# Patient Record
Sex: FEMALE | Race: WHITE | NOT HISPANIC OR LATINO | Employment: FULL TIME | URBAN - METROPOLITAN AREA
[De-identification: names, ages, dates, MRNs, and addresses within clinical notes are randomized per-mention and may not be internally consistent; named-entity substitution may affect disease eponyms.]

---

## 2017-02-08 ENCOUNTER — OFFICE VISIT (OUTPATIENT)
Dept: FAMILY MEDICINE CLINIC | Facility: CLINIC | Age: 32
End: 2017-02-08

## 2017-02-08 VITALS
OXYGEN SATURATION: 100 % | BODY MASS INDEX: 44.24 KG/M2 | WEIGHT: 265.5 LBS | DIASTOLIC BLOOD PRESSURE: 80 MMHG | HEIGHT: 65 IN | SYSTOLIC BLOOD PRESSURE: 130 MMHG | HEART RATE: 98 BPM

## 2017-02-08 DIAGNOSIS — R50.9 FEVER AND CHILLS: ICD-10-CM

## 2017-02-08 DIAGNOSIS — R60.0 EDEMA EXTREMITIES: ICD-10-CM

## 2017-02-08 DIAGNOSIS — M79.10 MYALGIA: Primary | ICD-10-CM

## 2017-02-08 LAB
ALBUMIN SERPL-MCNC: 4.2 G/DL (ref 3.5–5.2)
ALBUMIN/GLOB SERPL: 1.4 G/DL
ALP SERPL-CCNC: 75 U/L (ref 39–117)
ALT SERPL-CCNC: 55 U/L (ref 1–33)
AST SERPL-CCNC: 61 U/L (ref 1–32)
BILIRUB SERPL-MCNC: 0.5 MG/DL (ref 0.1–1.2)
BUN SERPL-MCNC: 12 MG/DL (ref 6–20)
BUN/CREAT SERPL: 19.4 (ref 7–25)
CALCIUM SERPL-MCNC: 10 MG/DL (ref 8.6–10.5)
CHLORIDE SERPL-SCNC: 99 MMOL/L (ref 98–107)
CO2 SERPL-SCNC: 27.8 MMOL/L (ref 22–29)
CREAT SERPL-MCNC: 0.62 MG/DL (ref 0.57–1)
CRP SERPL-MCNC: 4.78 MG/DL (ref 0–0.5)
ERYTHROCYTE [DISTWIDTH] IN BLOOD BY AUTOMATED COUNT: 14.7 % (ref 11.7–13)
ERYTHROCYTE [SEDIMENTATION RATE] IN BLOOD BY WESTERGREN METHOD: 29 MM/HR (ref 0–20)
EXPIRATION DATE: NORMAL
FLUAV AG NPH QL: NEGATIVE
FLUBV AG NPH QL: NEGATIVE
GLOBULIN SER CALC-MCNC: 3.1 GM/DL
GLUCOSE SERPL-MCNC: 95 MG/DL (ref 65–99)
HCT VFR BLD AUTO: 44.5 % (ref 35.6–45.5)
HGB BLD-MCNC: 14.3 G/DL (ref 11.9–15.5)
INTERNAL CONTROL: NORMAL
Lab: NORMAL
MCH RBC QN AUTO: 29.5 PG (ref 26.9–32)
MCHC RBC AUTO-ENTMCNC: 32.1 G/DL (ref 32.4–36.3)
MCV RBC AUTO: 91.9 FL (ref 80.5–98.2)
PLATELET # BLD AUTO: 324 10*3/MM3 (ref 140–500)
POTASSIUM SERPL-SCNC: 4.7 MMOL/L (ref 3.5–5.2)
PROT SERPL-MCNC: 7.3 G/DL (ref 6–8.5)
RBC # BLD AUTO: 4.84 10*6/MM3 (ref 3.9–5.2)
SODIUM SERPL-SCNC: 142 MMOL/L (ref 136–145)
TSH SERPL DL<=0.005 MIU/L-ACNC: 1.72 MIU/ML (ref 0.27–4.2)
WBC # BLD AUTO: 12.28 10*3/MM3 (ref 4.5–10.7)

## 2017-02-08 PROCEDURE — 87804 INFLUENZA ASSAY W/OPTIC: CPT | Performed by: FAMILY MEDICINE

## 2017-02-08 PROCEDURE — 99214 OFFICE O/P EST MOD 30 MIN: CPT | Performed by: FAMILY MEDICINE

## 2017-02-08 NOTE — PROGRESS NOTES
Maria Antonia Armando is a 31 y.o. female here due to bilateral LE Edema for four days.    History of Present Illness   Saturday she had swollen hands, Sunday it was hands and feet. She has sore arms, legs and feet are sore. She had chills and fever yesterday. She has not had a cough or runny nose. She has not had a flu shot this year.  She is better today but still found it hard to get out of bed this morning due to pain.     The following portions of the patient's history were reviewed and updated as appropriate: allergies, past family history, past medical history, past social history and problem list.    Review of Systems   Constitutional: Negative for activity change, chills, fatigue, fever and unexpected weight change.   HENT: Negative for congestion, sinus pressure, sore throat, tinnitus and trouble swallowing.    Eyes: Negative for discharge and visual disturbance.   Respiratory: Negative for cough, chest tightness, shortness of breath and wheezing.    Cardiovascular: Negative for chest pain, palpitations and leg swelling.   Gastrointestinal: Negative for abdominal distention, abdominal pain, constipation, diarrhea, nausea and vomiting.   Endocrine: Negative for cold intolerance, heat intolerance, polydipsia and polyuria.   Genitourinary: Negative for difficulty urinating, dysuria, frequency and urgency.   Musculoskeletal: Positive for gait problem and joint swelling. Negative for arthralgias and myalgias.   Skin: Negative for color change, rash and wound.   Neurological: Negative for dizziness, seizures, syncope, speech difficulty, weakness, light-headedness, numbness and headaches.   Hematological: Does not bruise/bleed easily.   Psychiatric/Behavioral: Negative for agitation, behavioral problems, confusion, hallucinations, self-injury, sleep disturbance and suicidal ideas. The patient is not nervous/anxious.        Objective   Physical Exam   Constitutional: She is oriented to person, place, and  time. She appears well-developed and well-nourished. She appears distressed.   HENT:   Head: Normocephalic and atraumatic.   Eyes: Conjunctivae and EOM are normal. Pupils are equal, round, and reactive to light.   Neck: Normal range of motion. Neck supple. No JVD present. No thyromegaly present.   Cardiovascular: Normal rate, regular rhythm, normal heart sounds and intact distal pulses.    No murmur heard.  Pulmonary/Chest: Effort normal and breath sounds normal. No respiratory distress. She has no wheezes.   Abdominal: Soft. There is no tenderness.   Musculoskeletal: She exhibits edema and tenderness. She exhibits no deformity.   Mild, non pitting edema in bilateral hands and feet.   Neurological: She is alert and oriented to person, place, and time.   Skin: Skin is warm and dry. No rash noted. No erythema.   Psychiatric: She has a normal mood and affect. Her behavior is normal. Judgment and thought content normal.   Nursing note and vitals reviewed.      Assessment/Plan   Libzeth was seen today for edema.    Diagnoses and all orders for this visit:    Myalgia    Fever and chills  POC Flu test was negative.    Edema extremities  -     Comprehensive Metabolic Panel  -     CBC (No Diff)  -     Sedimentation Rate  -     TSH  -     C-reactive Protein    I am uncertain of etiology but cannot r/o acute inflammatory process or underlying auto immune disorder. I have ordered labs as noted above and advised OTC NSAIDs, to ER if she gets worse.

## 2017-02-08 NOTE — PATIENT INSTRUCTIONS
I will call you with lab results. In the meantime, take Advil for aches and take a few days off work.   Call me if you get worse or go to ER.

## 2017-02-09 ENCOUNTER — APPOINTMENT (OUTPATIENT)
Dept: GENERAL RADIOLOGY | Facility: HOSPITAL | Age: 32
End: 2017-02-09

## 2017-02-09 ENCOUNTER — HOSPITAL ENCOUNTER (EMERGENCY)
Facility: HOSPITAL | Age: 32
Discharge: HOME | End: 2017-02-09
Attending: EMERGENCY MEDICINE | Admitting: EMERGENCY MEDICINE

## 2017-02-09 ENCOUNTER — APPOINTMENT (OUTPATIENT)
Dept: ULTRASOUND IMAGING | Facility: HOSPITAL | Age: 32
End: 2017-02-09

## 2017-02-09 VITALS
OXYGEN SATURATION: 98 % | BODY MASS INDEX: 44.39 KG/M2 | HEART RATE: 98 BPM | HEIGHT: 64 IN | RESPIRATION RATE: 16 BRPM | DIASTOLIC BLOOD PRESSURE: 81 MMHG | SYSTOLIC BLOOD PRESSURE: 135 MMHG | WEIGHT: 260 LBS | TEMPERATURE: 99.1 F

## 2017-02-09 DIAGNOSIS — R60.0 EXTREMITY EDEMA: ICD-10-CM

## 2017-02-09 DIAGNOSIS — M79.10 MYALGIA: Primary | ICD-10-CM

## 2017-02-09 DIAGNOSIS — K76.0 FATTY LIVER: ICD-10-CM

## 2017-02-09 LAB
ALBUMIN SERPL-MCNC: 3.9 G/DL (ref 3.5–5.2)
ALBUMIN/GLOB SERPL: 1.1 G/DL
ALP SERPL-CCNC: 69 U/L (ref 39–117)
ALT SERPL W P-5'-P-CCNC: 53 U/L (ref 1–33)
ANION GAP SERPL CALCULATED.3IONS-SCNC: 11.8 MMOL/L
AST SERPL-CCNC: 60 U/L (ref 1–32)
BASOPHILS # BLD AUTO: 0.02 10*3/MM3 (ref 0–0.2)
BASOPHILS NFR BLD AUTO: 0.2 % (ref 0–1.5)
BILIRUB SERPL-MCNC: 0.4 MG/DL (ref 0.1–1.2)
BILIRUB UR QL STRIP: NEGATIVE
BUN BLD-MCNC: 12 MG/DL (ref 6–20)
BUN/CREAT SERPL: 21.1 (ref 7–25)
CALCIUM SPEC-SCNC: 9.5 MG/DL (ref 8.6–10.5)
CHLORIDE SERPL-SCNC: 102 MMOL/L (ref 98–107)
CLARITY UR: CLEAR
CO2 SERPL-SCNC: 27.2 MMOL/L (ref 22–29)
COLOR UR: YELLOW
CREAT BLD-MCNC: 0.57 MG/DL (ref 0.57–1)
D-LACTATE SERPL-SCNC: 1.1 MMOL/L (ref 0.5–2)
DEPRECATED RDW RBC AUTO: 49.5 FL (ref 37–54)
EOSINOPHIL # BLD AUTO: 0.34 10*3/MM3 (ref 0–0.7)
EOSINOPHIL NFR BLD AUTO: 3 % (ref 0.3–6.2)
ERYTHROCYTE [DISTWIDTH] IN BLOOD BY AUTOMATED COUNT: 14.7 % (ref 11.7–13)
GFR SERPL CREATININE-BSD FRML MDRD: 123 ML/MIN/1.73
GLOBULIN UR ELPH-MCNC: 3.6 GM/DL
GLUCOSE BLD-MCNC: 112 MG/DL (ref 65–99)
GLUCOSE UR STRIP-MCNC: NEGATIVE MG/DL
HCG SERPL QL: NEGATIVE
HCT VFR BLD AUTO: 44.3 % (ref 35.6–45.5)
HGB BLD-MCNC: 14.1 G/DL (ref 11.9–15.5)
HGB UR QL STRIP.AUTO: NEGATIVE
IMM GRANULOCYTES # BLD: 0.03 10*3/MM3 (ref 0–0.03)
IMM GRANULOCYTES NFR BLD: 0.3 % (ref 0–0.5)
KETONES UR QL STRIP: NEGATIVE
LEUKOCYTE ESTERASE UR QL STRIP.AUTO: NEGATIVE
LYMPHOCYTES # BLD AUTO: 2.27 10*3/MM3 (ref 0.9–4.8)
LYMPHOCYTES NFR BLD AUTO: 20.1 % (ref 19.6–45.3)
Lab: NORMAL
MCH RBC QN AUTO: 29.1 PG (ref 26.9–32)
MCHC RBC AUTO-ENTMCNC: 31.8 G/DL (ref 32.4–36.3)
MCV RBC AUTO: 91.5 FL (ref 80.5–98.2)
MONOCYTES # BLD AUTO: 0.61 10*3/MM3 (ref 0.2–1.2)
MONOCYTES NFR BLD AUTO: 5.4 % (ref 5–12)
NEUTROPHILS # BLD AUTO: 8.02 10*3/MM3 (ref 1.9–8.1)
NEUTROPHILS NFR BLD AUTO: 71 % (ref 42.7–76)
NITRITE UR QL STRIP: NEGATIVE
NT-PROBNP SERPL-MCNC: 10.9 PG/ML (ref 5–450)
PH UR STRIP.AUTO: 5.5 [PH] (ref 5–8)
PLATELET # BLD AUTO: 353 10*3/MM3 (ref 140–500)
PMV BLD AUTO: 11.1 FL (ref 6–12)
POTASSIUM BLD-SCNC: 4.2 MMOL/L (ref 3.5–5.2)
PROT SERPL-MCNC: 7.5 G/DL (ref 6–8.5)
PROT UR QL STRIP: NEGATIVE
RBC # BLD AUTO: 4.84 10*6/MM3 (ref 3.9–5.2)
SODIUM BLD-SCNC: 141 MMOL/L (ref 136–145)
SP GR UR STRIP: 1.02 (ref 1–1.03)
SPECIMEN STATUS: NORMAL
UROBILINOGEN UR QL STRIP: NORMAL
WBC NRBC COR # BLD: 11.29 10*3/MM3 (ref 4.5–10.7)

## 2017-02-09 PROCEDURE — 83605 ASSAY OF LACTIC ACID: CPT | Performed by: EMERGENCY MEDICINE

## 2017-02-09 PROCEDURE — 80053 COMPREHEN METABOLIC PANEL: CPT | Performed by: EMERGENCY MEDICINE

## 2017-02-09 PROCEDURE — 84703 CHORIONIC GONADOTROPIN ASSAY: CPT | Performed by: EMERGENCY MEDICINE

## 2017-02-09 PROCEDURE — 81003 URINALYSIS AUTO W/O SCOPE: CPT | Performed by: EMERGENCY MEDICINE

## 2017-02-09 PROCEDURE — 71020 HC CHEST PA AND LATERAL: CPT

## 2017-02-09 PROCEDURE — 85025 COMPLETE CBC W/AUTO DIFF WBC: CPT | Performed by: EMERGENCY MEDICINE

## 2017-02-09 PROCEDURE — 36415 COLL VENOUS BLD VENIPUNCTURE: CPT | Performed by: EMERGENCY MEDICINE

## 2017-02-09 PROCEDURE — 99283 EMERGENCY DEPT VISIT LOW MDM: CPT

## 2017-02-09 PROCEDURE — 83880 ASSAY OF NATRIURETIC PEPTIDE: CPT | Performed by: EMERGENCY MEDICINE

## 2017-02-09 PROCEDURE — 76705 ECHO EXAM OF ABDOMEN: CPT

## 2017-02-09 NOTE — ED NOTES
Pt complains of chills and fever sometimes, also is about 20 lbs over baseline due to swelling.      Holden Ott RN  02/09/17 3564

## 2017-02-09 NOTE — ED NOTES
"Pt was seen in Dr. Lau' office yesterday for swelling x 6 days and generalized pain.  Dr. Lau called pt today and advised her to come to ER for further workup regarding WBC, liver enzymes, and that \"the inflammation was 4.5.\"     Nai Qureshi RN  02/09/17 2403    "

## 2017-02-09 NOTE — ED PROVIDER NOTES
EMERGENCY DEPARTMENT ENCOUNTER    CHIEF COMPLAINT  Chief Complaint: generalized edema  History given by: patient  History limited by: nothing  Room Number: 32/32  PMD: Bernardo Lau MD      HPI:  Pt is a 31 y.o. female who presents complaining of generalized edema onset 6 days ago. Pt denies chest pain, cough, SOA, N/V/D, fever, chills. Pt was referred to the ED by her PCP and was told her WBC and LFTs were elevated. Pt had similar sx a few years ago but not as generalized.    Duration:  6 days  Onset: gradual  Timing: constant  Location: generalized  Radiation: n/a  Quality: swelling  Intensity/Severity: mild  Progression: unchanged  Associated Symptoms: none  Aggravating Factors: unknown  Alleviating Factors: unknown  Previous Episodes: 1 episode prior but confined to lower extremities  Treatment before arrival: none    PAST MEDICAL HISTORY  Active Ambulatory Problems     Diagnosis Date Noted   • No Active Ambulatory Problems     Resolved Ambulatory Problems     Diagnosis Date Noted   • No Resolved Ambulatory Problems     Past Medical History   Diagnosis Date   • Anemia    • Hypertension    • PCOS (polycystic ovarian syndrome)        PAST SURGICAL HISTORY  Past Surgical History   Procedure Laterality Date   • Tonsillectomy     • Pap smear  09/2015       FAMILY HISTORY  Family History   Problem Relation Age of Onset   • Stroke Paternal Uncle    • Diabetes type II Maternal Grandfather    • Hypertension Paternal Grandmother    • Anemia Mother        SOCIAL HISTORY  Social History     Social History   • Marital status: Single     Spouse name: N/A   • Number of children: N/A   • Years of education: N/A     Occupational History   • Not on file.     Social History Main Topics   • Smoking status: Current Every Day Smoker     Packs/day: 0.50   • Smokeless tobacco: Not on file   • Alcohol use No   • Drug use: No   • Sexual activity: Defer     Other Topics Concern   • Not on file     Social History Narrative   • No  narrative on file       ALLERGIES  Review of patient's allergies indicates no known allergies.    REVIEW OF SYSTEMS  Review of Systems   Constitutional: Negative for chills and fever.   HENT: Negative for congestion and sore throat.    Eyes: Negative.    Respiratory: Negative for cough and shortness of breath.    Cardiovascular: Negative for chest pain and leg swelling.   Gastrointestinal: Negative for abdominal pain, diarrhea and vomiting.   Genitourinary: Negative for difficulty urinating and dysuria.   Musculoskeletal: Negative for back pain and neck pain.        Generalized edema   Skin: Negative for rash and wound.   Allergic/Immunologic: Negative.    Neurological: Negative for dizziness, weakness, numbness and headaches.   Psychiatric/Behavioral: Negative.    All other systems reviewed and are negative.      PHYSICAL EXAM  ED Triage Vitals   Temp Heart Rate Resp BP SpO2   02/09/17 1249 02/09/17 1249 02/09/17 1249 02/09/17 1259 02/09/17 1249   98.3 °F (36.8 °C) 113 16 158/81 99 %      Temp src Heart Rate Source Patient Position BP Location FiO2 (%)   02/09/17 1249 02/09/17 1249 -- -- --   Tympanic Monitor          Physical Exam   Constitutional: She is oriented to person, place, and time and well-developed, well-nourished, and in no distress. No distress.   HENT:   Head: Normocephalic and atraumatic.   Eyes: EOM are normal. Pupils are equal, round, and reactive to light.   Neck: Normal range of motion. Neck supple.   Cardiovascular: Normal rate, regular rhythm and normal heart sounds.    Pulmonary/Chest: Effort normal and breath sounds normal. No respiratory distress.   Abdominal: Soft. There is no tenderness. There is no rebound and no guarding.   Musculoskeletal: Normal range of motion. She exhibits edema (mild extremity, non pitting).   Neurological: She is alert and oriented to person, place, and time. She has normal sensation and normal strength.   Skin: Skin is warm and dry. No rash noted.   Psychiatric:  Mood and affect normal.   Nursing note and vitals reviewed.      LAB RESULTS  Lab Results (last 24 hours)     Procedure Component Value Units Date/Time    CBC & Differential [[id number]] Collected:  02/09/17 1403    Specimen:  Blood Updated:  02/09/17 1426    Narrative:       The following orders were created for panel order CBC & Differential.  Procedure                               Abnormality         Status                     ---------                               -----------         ------                     CBC Auto Differential[[id number]]         Abnormal            Final result                 Please view results for these tests on the individual orders.    Comprehensive Metabolic Panel [[id number]]  (Abnormal) Collected:  02/09/17 1403    Specimen:  Blood from Arm, Left Updated:  02/09/17 1442     Glucose 112 (H) mg/dL      BUN 12 mg/dL      Creatinine 0.57 mg/dL      Sodium 141 mmol/L      Potassium 4.2 mmol/L      Chloride 102 mmol/L      CO2 27.2 mmol/L      Calcium 9.5 mg/dL      Total Protein 7.5 g/dL      Albumin 3.90 g/dL      ALT (SGPT) 53 (H) U/L      AST (SGOT) 60 (H) U/L      Alkaline Phosphatase 69 U/L      Total Bilirubin 0.4 mg/dL      eGFR Non African Amer 123 mL/min/1.73      Globulin 3.6 gm/dL      A/G Ratio 1.1 g/dL      BUN/Creatinine Ratio 21.1      Anion Gap 11.8 mmol/L     CBC Auto Differential [[id number]]  (Abnormal) Collected:  02/09/17 1403    Specimen:  Blood Updated:  02/09/17 1426     WBC 11.29 (H) 10*3/mm3      RBC 4.84 10*6/mm3      Hemoglobin 14.1 g/dL      Hematocrit 44.3 %      MCV 91.5 fL      MCH 29.1 pg      MCHC 31.8 (L) g/dL      RDW 14.7 (H) %      RDW-SD 49.5 fl      MPV 11.1 fL      Platelets 353 10*3/mm3      Neutrophil % 71.0 %      Lymphocyte % 20.1 %      Monocyte % 5.4 %      Eosinophil % 3.0 %      Basophil % 0.2 %      Immature Grans % 0.3 %      Neutrophils, Absolute 8.02 10*3/mm3      Lymphocytes, Absolute 2.27 10*3/mm3      Monocytes, Absolute 0.61 10*3/mm3       Eosinophils, Absolute 0.34 10*3/mm3      Basophils, Absolute 0.02 10*3/mm3      Immature Grans, Absolute 0.03 10*3/mm3     BNP [[id number]]  (Normal) Collected:  02/09/17 1403    Specimen:  Blood from Arm, Left Updated:  02/09/17 1458     proBNP 10.9 pg/mL     Narrative:       Among patients with dyspnea, NT-proBNP is highly sensitive for the detection of acute congestive heart failure. In addition NT-proBNP of <300 pg/ml effectively rules out acute congestive heart failure with 99% negative predictive value.    Urinalysis With / Culture If Indicated [[id number]]  (Normal) Collected:  02/09/17 1414    Specimen:  Urine from Urine, Clean Catch Updated:  02/09/17 1434     Color, UA Yellow      Appearance, UA Clear      pH, UA 5.5      Specific Gravity, UA 1.019      Glucose, UA Negative      Ketones, UA Negative      Bilirubin, UA Negative      Blood, UA Negative      Protein, UA Negative      Leuk Esterase, UA Negative      Nitrite, UA Negative      Urobilinogen, UA 0.2 E.U./dL     Narrative:       Urine microscopic not indicated.    hCG, Serum, Qualitative [[id number]]  (Normal) Collected:  02/09/17 1443    Specimen:  Blood Updated:  02/09/17 1505     HCG Qualitative Negative     Lactic Acid, Plasma [[id number]]  (Normal) Collected:  02/09/17 1656    Specimen:  Blood Updated:  02/09/17 1719     Lactate 1.1 mmol/L         I ordered the above labs and reviewed the results    RADIOLOGY  XR Chest 2 View   Preliminary Result   No active disease is seen in the chest.          US Gallbladder    (Results Pending)   US gallbladder shows fatty liver but nothing acute.    I ordered the above noted radiological studies. Interpreted by radiologist. Reviewed by me in PACS.       PROCEDURES  Procedures      PROGRESS AND CONSULTS  ED Course     1431  Ordered hCG to r/o pregnancy. Ordered BNP and US gallbladder for further evaluation. Placed call to PCP for consult.    1457  Call returned from Dr. Lau, PCP, who requests lactic acid  test and will f/u w/ pt in office if normal. Ordered lactic acid.    1724  Rechecked pt who is resting comfortably. D/w pt negative lactic acid, lower WBC compared to yesterday, and plan to d/c. Recommended f/u w/ Dr. Lau for further test results next week. Gave RTER warnings. Pt and family understand and agree with the plan. All questions answered.    MEDICAL DECISION MAKING  Results were reviewed/discussed with the patient and they were also made aware of online access. Pt also made aware that some labs, such as cultures, will not be resulted during ER visit and follow up with PMD is necessary.     MDM  Number of Diagnoses or Management Options     Amount and/or Complexity of Data Reviewed  Clinical lab tests: reviewed and ordered (Lactic acid = 1.1)  Tests in the radiology section of CPT®: reviewed and ordered (CXR shows NAD. US gallbladder shows fatty liver but nothing acute.)  Decide to obtain previous medical records or to obtain history from someone other than the patient: yes  Review and summarize past medical records: yes (LFTs from 1 day ago were slightly elevated. WBC 12.8.)  Discuss the patient with other providers: yes (Dr. Lau, PCP)  Independent visualization of images, tracings, or specimens: yes           DIAGNOSIS  Final diagnoses:   Myalgia   Extremity edema   Fatty liver       DISPOSITION  DISCHARGE    Patient discharged in stable condition.    Reviewed implications of results, diagnosis, meds, responsibility to follow up, warning signs and symptoms of possible worsening, potential complications and reasons to return to ER, including worsening edema.    Patient/Family voiced understanding of above instructions.    Discussed plan for discharge, as there is no emergent indication for admission.  Pt/family is agreeable and understands need for follow up and repeat testing.  Pt is aware that discharge does not mean that nothing is wrong but it indicates no emergency is present that requires  admission and they must continue care with follow-up as given below or physician of their choice.     FOLLOW-UP  Bernardo Lau MD  [address] [place] [id number] [phone]    Call in 1 day  For follow-up       Medication List      Notice     No changes were made to your prescriptions during this visit.      Latest Documented Vital Signs:  As of 5:48 PM  BP- 135/81 HR- 98 Temp- 99.1 °F (37.3 °C) (Oral) O2 sat- 98%    --  Documentation assistance provided by ronnie Doty for Dr. Milton.  Information recorded by the scribe was done at my direction and has been verified and validated by me.     Franko Doty  02/09/17 9349       Carlos Milton MD  02/09/17 3541

## 2017-02-10 ENCOUNTER — TELEPHONE (OUTPATIENT)
Dept: FAMILY MEDICINE CLINIC | Facility: CLINIC | Age: 32
End: 2017-02-10

## 2017-02-10 LAB
ANA SER QL: NEGATIVE
RHEUMATOID FACT SERPL-ACNC: <10 IU/ML (ref 0–13.9)

## 2017-02-10 NOTE — TELEPHONE ENCOUNTER
----- Message from Kim Loyola sent at 2/10/2017 12:23 PM EST -----  Patient was sent to ER yesterday and is requesting test results.  [phone]

## 2017-02-14 ENCOUNTER — OFFICE VISIT (OUTPATIENT)
Dept: FAMILY MEDICINE CLINIC | Facility: CLINIC | Age: 32
End: 2017-02-14

## 2017-02-14 VITALS
WEIGHT: 266 LBS | HEIGHT: 64 IN | RESPIRATION RATE: 18 BRPM | HEART RATE: 94 BPM | BODY MASS INDEX: 45.41 KG/M2 | TEMPERATURE: 99.5 F | DIASTOLIC BLOOD PRESSURE: 80 MMHG | OXYGEN SATURATION: 98 % | SYSTOLIC BLOOD PRESSURE: 150 MMHG

## 2017-02-14 DIAGNOSIS — R50.9 FEVER AND CHILLS: ICD-10-CM

## 2017-02-14 DIAGNOSIS — R60.0 EDEMA EXTREMITIES: ICD-10-CM

## 2017-02-14 DIAGNOSIS — I10 ESSENTIAL HYPERTENSION: ICD-10-CM

## 2017-02-14 DIAGNOSIS — D72.829 LEUKOCYTOSIS, UNSPECIFIED TYPE: ICD-10-CM

## 2017-02-14 DIAGNOSIS — M79.10 MYALGIA: Primary | ICD-10-CM

## 2017-02-14 LAB
BASOPHILS # BLD AUTO: 0.02 10*3/MM3 (ref 0–0.2)
BASOPHILS NFR BLD AUTO: 0.2 % (ref 0–1.5)
CRP SERPL-MCNC: 1.38 MG/DL (ref 0–0.5)
EOSINOPHIL # BLD AUTO: 0.29 10*3/MM3 (ref 0–0.7)
EOSINOPHIL NFR BLD AUTO: 3.5 % (ref 0.3–6.2)
ERYTHROCYTE [DISTWIDTH] IN BLOOD BY AUTOMATED COUNT: 14.4 % (ref 11.7–13)
HCT VFR BLD AUTO: 42.5 % (ref 35.6–45.5)
HGB BLD-MCNC: 13.4 G/DL (ref 11.9–15.5)
IMM GRANULOCYTES # BLD: 0.06 10*3/MM3 (ref 0–0.03)
IMM GRANULOCYTES NFR BLD: 0.7 % (ref 0–0.5)
LYMPHOCYTES # BLD AUTO: 2.13 10*3/MM3 (ref 0.9–4.8)
LYMPHOCYTES NFR BLD AUTO: 25.9 % (ref 19.6–45.3)
MCH RBC QN AUTO: 29.2 PG (ref 26.9–32)
MCHC RBC AUTO-ENTMCNC: 31.5 G/DL (ref 32.4–36.3)
MCV RBC AUTO: 92.6 FL (ref 80.5–98.2)
MONOCYTES # BLD AUTO: 0.41 10*3/MM3 (ref 0.2–1.2)
MONOCYTES NFR BLD AUTO: 5 % (ref 5–12)
NEUTROPHILS # BLD AUTO: 5.31 10*3/MM3 (ref 1.9–8.1)
NEUTROPHILS NFR BLD AUTO: 64.7 % (ref 42.7–76)
PLATELET # BLD AUTO: 299 10*3/MM3 (ref 140–500)
RBC # BLD AUTO: 4.59 10*6/MM3 (ref 3.9–5.2)
WBC # BLD AUTO: 8.22 10*3/MM3 (ref 4.5–10.7)

## 2017-02-14 PROCEDURE — 99214 OFFICE O/P EST MOD 30 MIN: CPT | Performed by: FAMILY MEDICINE

## 2017-02-14 NOTE — PATIENT INSTRUCTIONS
I will call you with lab results. Take Advil 2 tabs every 8 hours for the next several days.  Take your B/p meds every day!

## 2017-02-14 NOTE — PROGRESS NOTES
Subjective   Lizbeth Armando is a 32 y.o. female.     History of Present Illness   Lizbeth is here for follow up of body aches and swelling.  She has been to the ER.  She states she is still swelling and still having quite a bit of pain but generally doing better.  She is taking Advil 400mg qid.  She has been tested for rheumatoid factor, autoimmune disorders and all has been negative. She had an elevated WBC, CRP and ESR.   She did not take B/P meds this morning.    The following portions of the patient's history were reviewed and updated as appropriate: allergies, current medications, past medical history, past social history and problem list.    Review of Systems   Constitutional: Positive for fatigue.   Musculoskeletal: Positive for arthralgias and joint swelling.   All other systems reviewed and are negative.      Objective   Physical Exam   Constitutional: She is oriented to person, place, and time. She appears well-developed and well-nourished. She appears distressed.   HENT:   Head: Normocephalic and atraumatic.   Eyes: Conjunctivae and EOM are normal. Pupils are equal, round, and reactive to light.   Neck: Normal range of motion. Neck supple.   Cardiovascular: Normal rate, regular rhythm, normal heart sounds and intact distal pulses.    No murmur heard.  Pulmonary/Chest: Effort normal and breath sounds normal. No respiratory distress. She has no wheezes.   Musculoskeletal: She exhibits edema. She exhibits no tenderness.   Mild, non pitting edema in bilateral hands and feet.   Neurological: She is alert and oriented to person, place, and time.   Skin: Skin is warm and dry. No rash noted. No erythema.   Psychiatric: She has a normal mood and affect. Her behavior is normal. Judgment and thought content normal.   Nursing note and vitals reviewed.      Assessment/Plan   Lizbeth was seen today for edema.    Diagnoses and all orders for this visit:    Myalgia  -     C-reactive protein  -     Sedimentation rate,  automated    Fever and chills  -     C-reactive protein  -     Sedimentation rate, automated    Edema extremities  -     C-reactive protein    Leukocytosis, unspecified type  -     CBC & Differential    Essential hypertension    I have advised her to take B/P meds daily.  She is improving and I suspect that this was a viral illness.I have advised Advil every 8 hours for a few more days until joint pain improves. I will call her with lab results and determine follow up after review. I have encouraged her to continue to rest.

## 2017-02-15 DIAGNOSIS — M79.10 MYALGIA: Primary | ICD-10-CM

## 2017-02-15 LAB — ERYTHROCYTE [SEDIMENTATION RATE] IN BLOOD BY WESTERGREN METHOD: 43 MM/HR (ref 0–20)

## 2017-03-01 ENCOUNTER — RESULTS ENCOUNTER (OUTPATIENT)
Dept: FAMILY MEDICINE CLINIC | Facility: CLINIC | Age: 32
End: 2017-03-01

## 2017-03-01 DIAGNOSIS — M79.10 MYALGIA: ICD-10-CM

## 2017-05-11 ENCOUNTER — OFFICE VISIT (OUTPATIENT)
Dept: FAMILY MEDICINE CLINIC | Facility: CLINIC | Age: 32
End: 2017-05-11

## 2017-05-11 VITALS
DIASTOLIC BLOOD PRESSURE: 80 MMHG | OXYGEN SATURATION: 99 % | HEIGHT: 64 IN | HEART RATE: 97 BPM | SYSTOLIC BLOOD PRESSURE: 125 MMHG | BODY MASS INDEX: 46.1 KG/M2 | WEIGHT: 270 LBS

## 2017-05-11 DIAGNOSIS — I10 ESSENTIAL HYPERTENSION: Primary | ICD-10-CM

## 2017-05-11 DIAGNOSIS — E66.01 MORBID OBESITY WITH BMI OF 45.0-49.9, ADULT (HCC): ICD-10-CM

## 2017-05-11 DIAGNOSIS — D50.8 OTHER IRON DEFICIENCY ANEMIA: ICD-10-CM

## 2017-05-11 DIAGNOSIS — M79.10 MYALGIA: ICD-10-CM

## 2017-05-11 PROBLEM — D64.9 ANEMIA: Status: ACTIVE | Noted: 2017-05-11

## 2017-05-11 PROCEDURE — 99214 OFFICE O/P EST MOD 30 MIN: CPT | Performed by: FAMILY MEDICINE

## 2017-05-12 DIAGNOSIS — D72.829 LEUKOCYTOSIS, UNSPECIFIED TYPE: Primary | ICD-10-CM

## 2017-05-12 DIAGNOSIS — R74.8 LIVER ENZYME ELEVATION: ICD-10-CM

## 2017-05-12 LAB
ALBUMIN SERPL-MCNC: 4.2 G/DL (ref 3.5–5.5)
ALBUMIN/GLOB SERPL: 1.4 {RATIO} (ref 1.2–2.2)
ALP SERPL-CCNC: 78 IU/L (ref 39–117)
ALT SERPL-CCNC: 93 IU/L (ref 0–32)
AST SERPL-CCNC: 109 IU/L (ref 0–40)
BILIRUB SERPL-MCNC: 0.4 MG/DL (ref 0–1.2)
BUN SERPL-MCNC: 15 MG/DL (ref 6–20)
BUN/CREAT SERPL: 27 (ref 9–23)
CALCIUM SERPL-MCNC: 9.7 MG/DL (ref 8.7–10.2)
CHLORIDE SERPL-SCNC: 96 MMOL/L (ref 96–106)
CO2 SERPL-SCNC: 23 MMOL/L (ref 18–29)
CREAT SERPL-MCNC: 0.55 MG/DL (ref 0.57–1)
CRP SERPL-MCNC: 13.6 MG/L (ref 0–4.9)
ERYTHROCYTE [DISTWIDTH] IN BLOOD BY AUTOMATED COUNT: 15.1 % (ref 12.3–15.4)
ERYTHROCYTE [SEDIMENTATION RATE] IN BLOOD BY WESTERGREN METHOD: 29 MM/HR (ref 0–32)
GLOBULIN SER CALC-MCNC: 2.9 G/DL (ref 1.5–4.5)
GLUCOSE SERPL-MCNC: 89 MG/DL (ref 65–99)
HCT VFR BLD AUTO: 44.2 % (ref 34–46.6)
HGB BLD-MCNC: 14.5 G/DL (ref 11.1–15.9)
MCH RBC QN AUTO: 29.2 PG (ref 26.6–33)
MCHC RBC AUTO-ENTMCNC: 32.8 G/DL (ref 31.5–35.7)
MCV RBC AUTO: 89 FL (ref 79–97)
PLATELET # BLD AUTO: 357 X10E3/UL (ref 150–379)
POTASSIUM SERPL-SCNC: 4.6 MMOL/L (ref 3.5–5.2)
PROT SERPL-MCNC: 7.1 G/DL (ref 6–8.5)
RBC # BLD AUTO: 4.97 X10E6/UL (ref 3.77–5.28)
SODIUM SERPL-SCNC: 140 MMOL/L (ref 134–144)
WBC # BLD AUTO: 11.8 X10E3/UL (ref 3.4–10.8)

## 2017-05-17 ENCOUNTER — RESULTS ENCOUNTER (OUTPATIENT)
Dept: FAMILY MEDICINE CLINIC | Facility: CLINIC | Age: 32
End: 2017-05-17

## 2017-05-17 DIAGNOSIS — R74.8 LIVER ENZYME ELEVATION: ICD-10-CM

## 2017-05-17 DIAGNOSIS — D72.829 LEUKOCYTOSIS, UNSPECIFIED TYPE: ICD-10-CM

## 2017-05-17 LAB
ALT SERPL-CCNC: 76 U/L (ref 1–33)
AST SERPL-CCNC: 76 U/L (ref 1–32)
BASOPHILS # BLD AUTO: 0.02 10*3/MM3 (ref 0–0.2)
BASOPHILS NFR BLD AUTO: 0.2 % (ref 0–1.5)
EOSINOPHIL # BLD AUTO: 0.34 10*3/MM3 (ref 0–0.7)
EOSINOPHIL NFR BLD AUTO: 3 % (ref 0.3–6.2)
ERYTHROCYTE [DISTWIDTH] IN BLOOD BY AUTOMATED COUNT: 14.5 % (ref 11.7–13)
HCT VFR BLD AUTO: 44.4 % (ref 35.6–45.5)
HGB BLD-MCNC: 14.4 G/DL (ref 11.9–15.5)
IMM GRANULOCYTES # BLD: 0.03 10*3/MM3 (ref 0–0.03)
IMM GRANULOCYTES NFR BLD: 0.3 % (ref 0–0.5)
LYMPHOCYTES # BLD AUTO: 2.52 10*3/MM3 (ref 0.9–4.8)
LYMPHOCYTES NFR BLD AUTO: 22.6 % (ref 19.6–45.3)
MCH RBC QN AUTO: 29.8 PG (ref 26.9–32)
MCHC RBC AUTO-ENTMCNC: 32.4 G/DL (ref 32.4–36.3)
MCV RBC AUTO: 91.7 FL (ref 80.5–98.2)
MONOCYTES # BLD AUTO: 0.77 10*3/MM3 (ref 0.2–1.2)
MONOCYTES NFR BLD AUTO: 6.9 % (ref 5–12)
NEUTROPHILS # BLD AUTO: 7.48 10*3/MM3 (ref 1.9–8.1)
NEUTROPHILS NFR BLD AUTO: 67 % (ref 42.7–76)
PLATELET # BLD AUTO: 332 10*3/MM3 (ref 140–500)
RBC # BLD AUTO: 4.84 10*6/MM3 (ref 3.9–5.2)
WBC # BLD AUTO: 11.16 10*3/MM3 (ref 4.5–10.7)

## 2017-05-18 DIAGNOSIS — M25.50 MULTIPLE JOINT PAIN: Primary | ICD-10-CM

## 2017-05-18 LAB
HAV AB SER QL IA: NEGATIVE
HBV CORE AB SERPL QL IA: NEGATIVE
HBV SURFACE AB SER QL: NON REACTIVE
HBV SURFACE AG SERPL QL IA: NEGATIVE
HCV AB S/CO SERPL IA: <0.1 S/CO RATIO (ref 0–0.9)

## 2017-07-13 ENCOUNTER — OFFICE VISIT (OUTPATIENT)
Dept: FAMILY MEDICINE CLINIC | Facility: CLINIC | Age: 32
End: 2017-07-13

## 2017-07-13 VITALS
HEIGHT: 64 IN | HEART RATE: 90 BPM | OXYGEN SATURATION: 98 % | BODY MASS INDEX: 47.46 KG/M2 | SYSTOLIC BLOOD PRESSURE: 135 MMHG | WEIGHT: 278 LBS | DIASTOLIC BLOOD PRESSURE: 80 MMHG

## 2017-07-13 DIAGNOSIS — Z23 NEED FOR VACCINATION: ICD-10-CM

## 2017-07-13 DIAGNOSIS — R53.83 LETHARGY: ICD-10-CM

## 2017-07-13 DIAGNOSIS — Z00.00 ROUTINE GENERAL MEDICAL EXAMINATION AT A HEALTH CARE FACILITY: Primary | ICD-10-CM

## 2017-07-13 DIAGNOSIS — I10 ESSENTIAL HYPERTENSION: ICD-10-CM

## 2017-07-13 DIAGNOSIS — E66.01 MORBID OBESITY WITH BMI OF 45.0-49.9, ADULT (HCC): ICD-10-CM

## 2017-07-13 DIAGNOSIS — M25.50 PAIN, JOINT, MULTIPLE SITES: ICD-10-CM

## 2017-07-13 DIAGNOSIS — M79.10 MYALGIA: ICD-10-CM

## 2017-07-13 DIAGNOSIS — D50.8 OTHER IRON DEFICIENCY ANEMIA: ICD-10-CM

## 2017-07-13 PROCEDURE — 99395 PREV VISIT EST AGE 18-39: CPT | Performed by: FAMILY MEDICINE

## 2017-07-13 PROCEDURE — 90715 TDAP VACCINE 7 YRS/> IM: CPT | Performed by: FAMILY MEDICINE

## 2017-07-13 PROCEDURE — 99214 OFFICE O/P EST MOD 30 MIN: CPT | Performed by: FAMILY MEDICINE

## 2017-07-13 PROCEDURE — 90471 IMMUNIZATION ADMIN: CPT | Performed by: FAMILY MEDICINE

## 2017-07-13 NOTE — PATIENT INSTRUCTIONS
I will call you with lab results.    Preventive Care 18-39 Years, Female  Preventive care refers to lifestyle choices and visits with your health care provider that can promote health and wellness.  WHAT DOES PREVENTIVE CARE INCLUDE?  · A yearly physical exam. This is also called an annual well check.  · Dental exams once or twice a year.  · Routine eye exams. Ask your health care provider how often you should have your eyes checked.  · Personal lifestyle choices, including:    Daily care of your teeth and gums.    Regular physical activity.    Eating a healthy diet.    Avoiding tobacco and drug use.    Limiting alcohol use.    Practicing safe sex.    Taking vitamin and mineral supplements as recommended by your health care provider.  WHAT HAPPENS DURING AN ANNUAL WELL CHECK?  The services and screenings done by your health care provider during your annual well check will depend on your age, overall health, lifestyle risk factors, and family history of disease.  Counseling  Your health care provider may ask you questions about your:  · Alcohol use.  · Tobacco use.  · Drug use.  · Emotional well-being.  · Home and relationship well-being.  · Sexual activity.  · Eating habits.  · Work and work environment.  · Method of birth control.  · Menstrual cycle.  · Pregnancy history.  Screening  You may have the following tests or measurements:  · Height, weight, and BMI.  · Diabetes screening. This is done by checking your blood sugar (glucose) after you have not eaten for a while (fasting).  · Blood pressure.  · Lipid and cholesterol levels. These may be checked every 5 years starting at age 20.  · Skin check.  · Hepatitis C blood test.  · Hepatitis B blood test.  · Sexually transmitted disease (STD) testing.  · BRCA-related cancer screening. This may be done if you have a family history of breast, ovarian, tubal, or peritoneal cancers.  · Pelvic exam and Pap test. This may be done every 3 years starting at age 21. Starting  at age 30, this may be done every 5 years if you have a Pap test in combination with an HPV test.  Discuss your test results, treatment options, and if necessary, the need for more tests with your health care provider.  Vaccines  Your health care provider may recommend certain vaccines, such as:  · Influenza vaccine. This is recommended every year.  · Tetanus, diphtheria, and acellular pertussis (Tdap, Td) vaccine. You may need a Td booster every 10 years.  · Varicella vaccine. You may need this if you have not been vaccinated.  · HPV vaccine. If you are 26 or younger, you may need three doses over 6 months.  · Measles, mumps, and rubella (MMR) vaccine. You may need at least one dose of MMR. You may also need a second dose.  · Pneumococcal 13-valent conjugate (PCV13) vaccine. You may need this if you have certain conditions and were not previously vaccinated.  · Pneumococcal polysaccharide (PPSV23) vaccine. You may need one or two doses if you smoke cigarettes or if you have certain conditions.  · Meningococcal vaccine. One dose is recommended if you are age 19-21 years and a first-year college student living [place], or if you have one of several medical conditions. You may also need additional booster doses.  · Hepatitis A vaccine. You may need this if you have certain conditions or if you travel or work in places where you may be exposed to hepatitis A.  · Hepatitis B vaccine. You may need this if you have certain conditions or if you travel or work in places where you may be exposed to hepatitis B.  · Haemophilus influenzae type b (Hib) vaccine. You may need this if you have certain risk factors.  Talk to your health care provider about which screenings and vaccines you need and how often you need them.     This information is not intended to replace advice given to you by your health care provider. Make sure you discuss any questions you have with your health care provider.     Document Released:  02/13/2003 Document Revised: 04/10/2017 Document Reviewed: 10/18/2016  [employer]vier Interactive Patient Education ©2017 [employer]

## 2017-07-13 NOTE — PROGRESS NOTES
"Preventive Exam    History of Present Illness: Lizbeth is here for check up and review of routine health maintenance.   She is still very tired, she has myalgias and arthralgias. She was referred to rheumatology but that office never called to get her in.  She had elevated CRP recently.   She has a hx of anemia.We need to check this again today.  Lizbeth has a history of chronic hypertension and has been well controlled on current medications.She is tolerating medications without side effect. She reports no vision changes, headaches or lightheadedness. She is requesting refills of medications.    REVIEW OF SYSTEMS  Constitutional: Negative.    HENT: Negative.    Eyes: Negative.    Respiratory: Negative.    Cardiovascular: Negative.    Gastrointestinal: Negative.    Endocrine: Negative.    Genitourinary: Negative.    Musculoskeletal: Negative.  Skin: Negative.    Allergic/Immunologic: Negative.    Neurological: Negative.    Hematological: Negative.    Psychiatric/Behavioral: Negative.    All other systems reviewed and are negative.          PHYSICAL EXAM    Vitals:    07/13/17 1528   BP: 135/80   BP Location: Right arm   Patient Position: Sitting   Pulse: 90   SpO2: 98%   Weight: 278 lb (126 kg)   Height: 64\" (162.6 cm)     GENERAL: alert and oriented, afebrile and vital signs stable  HEENT: oral mucosa moist, PEERLA, EOM, conjunctiva normal  No cervical adenopathy  LUNGS: clear to ascultation bilaterally, no rales, ronchi or wheezing  HEART: RRR S1 S2 without murmers, thrills, rubs or gallops  CHEST WALL: within normal limits, no tenderness  ABDOMEN: WNL. Normal BS.  EXTREMITIES: No clubbing, cyanosis or edema noted. Normal Pulses.  SKIN: warm, dry, no rashes noted  NEURO: CN II- XII grossly intact    ASSESSMENT AND PLAN  Problem List Items Addressed This Visit        Cardiovascular and Mediastinum    Hypertension  Well controlled on current medication, will refill medication today and as needed. She will RTO for " repeat B/P check in 6 months.       Hematopoietic and Hemostatic    Anemia    Relevant Orders    CBC (No Diff)      Other Visit Diagnoses     Routine general medical examination at a health care facility    -  Primary    Relevant Orders    Comprehensive Metabolic Panel    Lipid Panel With / Chol / HDL Ratio    Morbid obesity with BMI of 45.0-49.9, adult     I continue to encourage diet and exercise     Myalgia        Relevant Orders    C-reactive Protein    Sedimentation rate, automated    Pain, joint, multiple sites      I am very concerned about this and she now states that her father  Has been diagnosed with RA - I have called specialist again to try to  Get her in to be seen    Relevant Orders    C-reactive Protein    Sedimentation rate, automated    Lethargy        Relevant Orders    CBC (No Diff)    TSH    Need for vaccination        Relevant Orders    Tdap Vaccine Greater Than or Equal To 6yo IM        Routine health maintenance reviewed and discussed with Lizbeth.    .  Orders Placed This Encounter   Procedures   • Tdap Vaccine Greater Than or Equal To 6yo IM   • CBC (No Diff)     Order Specific Question:   [facility] Has the patient fasted?     Answer:   Yes   • Comprehensive Metabolic Panel     Order Specific Question:   [facility] Has the patient fasted?     Answer:   Yes   • Lipid Panel With / Chol / HDL Ratio     Order Specific Question:   [facility] Has the patient fasted?     Answer:   Yes   • TSH     Order Specific Question:   [facility] Has the patient fasted?     Answer:   Yes   • C-reactive Protein     Order Specific Question:   [facility] Has the patient fasted?     Answer:   Yes   • Sedimentation rate, automated     Order Specific Question:   [facility] Has the patient fasted?     Answer:   Yes     Return in about 3 months (around 10/13/2017) for Recheck.

## 2017-07-14 LAB
ALBUMIN SERPL-MCNC: 4.2 G/DL (ref 3.5–5.5)
ALBUMIN/GLOB SERPL: 1.6 {RATIO} (ref 1.2–2.2)
ALP SERPL-CCNC: 72 IU/L (ref 39–117)
ALT SERPL-CCNC: 71 IU/L (ref 0–32)
AST SERPL-CCNC: 82 IU/L (ref 0–40)
BILIRUB SERPL-MCNC: 0.2 MG/DL (ref 0–1.2)
BUN SERPL-MCNC: 11 MG/DL (ref 6–20)
BUN/CREAT SERPL: 19 (ref 9–23)
CALCIUM SERPL-MCNC: 9.3 MG/DL (ref 8.7–10.2)
CHLORIDE SERPL-SCNC: 100 MMOL/L (ref 96–106)
CHOLEST SERPL-MCNC: 207 MG/DL (ref 100–199)
CHOLEST/HDLC SERPL: 6.7 RATIO UNITS (ref 0–4.4)
CO2 SERPL-SCNC: 26 MMOL/L (ref 18–29)
CREAT SERPL-MCNC: 0.59 MG/DL (ref 0.57–1)
CRP SERPL-MCNC: 12.9 MG/L (ref 0–4.9)
ERYTHROCYTE [DISTWIDTH] IN BLOOD BY AUTOMATED COUNT: 15 % (ref 12.3–15.4)
ERYTHROCYTE [SEDIMENTATION RATE] IN BLOOD BY WESTERGREN METHOD: 10 MM/HR (ref 0–32)
GLOBULIN SER CALC-MCNC: 2.6 G/DL (ref 1.5–4.5)
GLUCOSE SERPL-MCNC: 93 MG/DL (ref 65–99)
HCT VFR BLD AUTO: 40.5 % (ref 34–46.6)
HDLC SERPL-MCNC: 31 MG/DL
HGB BLD-MCNC: 13.5 G/DL (ref 11.1–15.9)
LDLC SERPL CALC-MCNC: 123 MG/DL (ref 0–99)
MCH RBC QN AUTO: 28.8 PG (ref 26.6–33)
MCHC RBC AUTO-ENTMCNC: 33.3 G/DL (ref 31.5–35.7)
MCV RBC AUTO: 86 FL (ref 79–97)
PLATELET # BLD AUTO: 279 X10E3/UL (ref 150–379)
POTASSIUM SERPL-SCNC: 4.2 MMOL/L (ref 3.5–5.2)
PROT SERPL-MCNC: 6.8 G/DL (ref 6–8.5)
RBC # BLD AUTO: 4.69 X10E6/UL (ref 3.77–5.28)
SODIUM SERPL-SCNC: 144 MMOL/L (ref 134–144)
TRIGL SERPL-MCNC: 264 MG/DL (ref 0–149)
TSH SERPL DL<=0.005 MIU/L-ACNC: 2.28 UIU/ML (ref 0.45–4.5)
VLDLC SERPL CALC-MCNC: 53 MG/DL (ref 5–40)
WBC # BLD AUTO: 9 X10E3/UL (ref 3.4–10.8)

## 2018-06-19 ENCOUNTER — OFFICE VISIT (OUTPATIENT)
Dept: FAMILY MEDICINE CLINIC | Facility: CLINIC | Age: 33
End: 2018-06-19

## 2018-06-19 ENCOUNTER — TELEPHONE (OUTPATIENT)
Dept: FAMILY MEDICINE CLINIC | Facility: CLINIC | Age: 33
End: 2018-06-19

## 2018-06-19 VITALS
HEART RATE: 75 BPM | TEMPERATURE: 98.2 F | SYSTOLIC BLOOD PRESSURE: 126 MMHG | WEIGHT: 258 LBS | HEIGHT: 64 IN | DIASTOLIC BLOOD PRESSURE: 90 MMHG | RESPIRATION RATE: 16 BRPM | BODY MASS INDEX: 44.05 KG/M2

## 2018-06-19 DIAGNOSIS — I10 ESSENTIAL HYPERTENSION: Primary | ICD-10-CM

## 2018-06-19 PROCEDURE — 99213 OFFICE O/P EST LOW 20 MIN: CPT | Performed by: FAMILY MEDICINE

## 2018-06-19 NOTE — TELEPHONE ENCOUNTER
Called and spoke with patient to let her know this was sent to [facility].    ----- Message from Estrella Weeks sent at 6/19/2018 11:39 AM EDT -----  Patient wants to make sure her lisinopril is being sent to [facility] after her visit today

## 2018-06-19 NOTE — PROGRESS NOTES
Subjective   Lizbeth Armando is a 33 y.o. female.     History of Present Illness  Patient presents to office today for bp pressure check. She is no longer taking HCTZ, just using lisinopril. No C/O any other issues.  Lizbeth has a history of chronic hypertension and has been well controlled on current medications.She is tolerating medications without side effect. She reports no vision changes, headaches or lightheadedness. She is requesting refills of medications.    The following portions of the patient's history were reviewed and updated as appropriate: allergies, current medications, past family history, past medical history, past social history and problem list.    Review of Systems   All other systems reviewed and are negative.      Objective   Physical Exam   Constitutional: She is oriented to person, place, and time. She appears well-developed.   HENT:   Head: Normocephalic and atraumatic.   Eyes: EOM are normal. Pupils are equal, round, and reactive to light.   Cardiovascular: Normal rate, regular rhythm and normal heart sounds.    Pulmonary/Chest: Effort normal and breath sounds normal.   Neurological: She is alert and oriented to person, place, and time.   Skin: Skin is warm and dry.   Nursing note and vitals reviewed.      Assessment/Plan   Lizbeth was seen today for blood pressure check.    Diagnoses and all orders for this visit:    Essential hypertension    Well controlled on current medication, will refill medication today and as needed. She will RTO for repeat B/P check in 3 months along with a physical exam.

## 2018-06-25 DIAGNOSIS — I10 ESSENTIAL HYPERTENSION: ICD-10-CM

## 2018-06-25 RX ORDER — LISINOPRIL 40 MG/1
40 TABLET ORAL DAILY
Qty: 90 TABLET | Refills: 1 | Status: SHIPPED | OUTPATIENT
Start: 2018-06-25 | End: 2019-01-28 | Stop reason: SDUPTHER

## 2018-09-27 ENCOUNTER — OFFICE VISIT (OUTPATIENT)
Dept: FAMILY MEDICINE CLINIC | Facility: CLINIC | Age: 33
End: 2018-09-27

## 2018-09-27 VITALS
TEMPERATURE: 97.4 F | BODY MASS INDEX: 43.19 KG/M2 | WEIGHT: 253 LBS | DIASTOLIC BLOOD PRESSURE: 76 MMHG | HEART RATE: 85 BPM | SYSTOLIC BLOOD PRESSURE: 102 MMHG | RESPIRATION RATE: 20 BRPM | HEIGHT: 64 IN | OXYGEN SATURATION: 98 %

## 2018-09-27 DIAGNOSIS — J06.9 ACUTE URI OF MULTIPLE SITES: ICD-10-CM

## 2018-09-27 DIAGNOSIS — F41.9 ANXIETY AND DEPRESSION: ICD-10-CM

## 2018-09-27 DIAGNOSIS — F32.A ANXIETY AND DEPRESSION: ICD-10-CM

## 2018-09-27 DIAGNOSIS — F17.200 SMOKER: ICD-10-CM

## 2018-09-27 DIAGNOSIS — D50.8 OTHER IRON DEFICIENCY ANEMIA: Primary | ICD-10-CM

## 2018-09-27 DIAGNOSIS — I10 ESSENTIAL HYPERTENSION: ICD-10-CM

## 2018-09-27 DIAGNOSIS — R05.9 COUGH: ICD-10-CM

## 2018-09-27 LAB
ALBUMIN SERPL-MCNC: 4.3 G/DL (ref 3.5–5.2)
ALBUMIN/GLOB SERPL: 1.5 G/DL
ALP SERPL-CCNC: 79 U/L (ref 39–117)
ALT SERPL-CCNC: 40 U/L (ref 1–33)
AST SERPL-CCNC: 33 U/L (ref 1–32)
BILIRUB SERPL-MCNC: 0.4 MG/DL (ref 0.1–1.2)
BUN SERPL-MCNC: 12 MG/DL (ref 6–20)
BUN/CREAT SERPL: 18.8 (ref 7–25)
CALCIUM SERPL-MCNC: 9.7 MG/DL (ref 8.6–10.5)
CHLORIDE SERPL-SCNC: 101 MMOL/L (ref 98–107)
CO2 SERPL-SCNC: 27.5 MMOL/L (ref 22–29)
CREAT SERPL-MCNC: 0.64 MG/DL (ref 0.57–1)
ERYTHROCYTE [DISTWIDTH] IN BLOOD BY AUTOMATED COUNT: 15.1 % (ref 11.7–13)
GLOBULIN SER CALC-MCNC: 2.9 GM/DL
GLUCOSE SERPL-MCNC: 102 MG/DL (ref 65–99)
HCT VFR BLD AUTO: 43.9 % (ref 35.6–45.5)
HGB BLD-MCNC: 13.8 G/DL (ref 11.9–15.5)
MCH RBC QN AUTO: 29.6 PG (ref 26.9–32)
MCHC RBC AUTO-ENTMCNC: 31.4 G/DL (ref 32.4–36.3)
MCV RBC AUTO: 94.2 FL (ref 80.5–98.2)
PLATELET # BLD AUTO: 312 10*3/MM3 (ref 140–500)
POTASSIUM SERPL-SCNC: 5.3 MMOL/L (ref 3.5–5.2)
PROT SERPL-MCNC: 7.2 G/DL (ref 6–8.5)
RBC # BLD AUTO: 4.66 10*6/MM3 (ref 3.9–5.2)
SODIUM SERPL-SCNC: 142 MMOL/L (ref 136–145)
WBC # BLD AUTO: 10.73 10*3/MM3 (ref 4.5–10.7)

## 2018-09-27 PROCEDURE — 99214 OFFICE O/P EST MOD 30 MIN: CPT | Performed by: FAMILY MEDICINE

## 2018-09-27 RX ORDER — AZITHROMYCIN 250 MG/1
TABLET, FILM COATED ORAL
Qty: 6 TABLET | Refills: 0 | Status: SHIPPED | OUTPATIENT
Start: 2018-09-27 | End: 2018-09-27 | Stop reason: SDUPTHER

## 2018-09-27 RX ORDER — AZITHROMYCIN 250 MG/1
TABLET, FILM COATED ORAL
Qty: 6 TABLET | Refills: 0 | Status: SHIPPED | OUTPATIENT
Start: 2018-09-27 | End: 2019-01-02

## 2018-09-27 NOTE — PROGRESS NOTES
Subjective   Lizbeth Armando is a 33 y.o. female.   Lizbeth is here today for a f/u on hypertension    .History of Present Illness   Patient does not need refills at this time and is doing well on medication. Patient is fasting for labs.Lizbeth has a history of chronic hypertension and has been well controlled on current medications.She is tolerating medications without side effect. She reports no vision changes, headaches or lightheadedness. She is requesting refills of medications.    She has a hx of anemia and we need to check her labs today. She is feeling very fatigued.    She is concerned about an increase in anxiety and feels she over thinking and she is cryng a lot for no reason. She feels this really affecting her work and her friend relationships. She is states her friends are concerned. She denies suicidal thoughts and states that she could not hurt herself, she is afraid of pain.      Patient also having cough, sore throat, and chest congestion for 1 week. Has tried Nyquil for sleep but nothing else OTC.  She is interested in working on smoking cessation.     The following portions of the patient's history were reviewed and updated as appropriate: allergies, current medications, past medical history, past social history, past surgical history and problem list.     Review of Systems   Constitutional: Negative for activity change, appetite change, chills, fatigue, fever and unexpected weight change.   HENT: Positive for congestion and sore throat. Negative for ear pain, hearing loss, nosebleeds and rhinorrhea.    Eyes: Negative for pain, redness and visual disturbance.   Respiratory: Positive for cough. Negative for shortness of breath and wheezing.    Cardiovascular: Negative for chest pain, palpitations and leg swelling.   Gastrointestinal: Negative for abdominal pain, blood in stool, constipation, diarrhea, nausea and vomiting.   Endocrine: Negative for cold intolerance and heat intolerance.    Genitourinary: Negative for difficulty urinating, dysuria, frequency, hematuria, pelvic pain, urgency and vaginal discharge.   Musculoskeletal: Negative for arthralgias, back pain and joint swelling.   Skin: Negative for rash and wound.   Neurological: Negative for dizziness, weakness, numbness and headaches.   Hematological: Does not bruise/bleed easily.   Psychiatric/Behavioral: Negative for dysphoric mood, sleep disturbance and suicidal ideas. The patient is not nervous/anxious.        Objective   Physical Exam   Constitutional: She is oriented to person, place, and time. She appears well-developed. No distress.   HENT:   Head: Normocephalic and atraumatic.   Eyes: Pupils are equal, round, and reactive to light. EOM are normal.   Neck: Normal range of motion. Neck supple.   Cardiovascular: Normal rate, regular rhythm and normal heart sounds.    Pulmonary/Chest: Effort normal and breath sounds normal.   Musculoskeletal: Normal range of motion.   Neurological: She is alert and oriented to person, place, and time.   Skin: Skin is warm and dry.   Psychiatric: She has a normal mood and affect. Her behavior is normal. Judgment and thought content normal.   Strongly denies suicidal thoughts.   Nursing note and vitals reviewed.      Assessment/Plan   Lizbeth was seen today for hypertension.    Diagnoses and all orders for this visit:    Other iron deficiency anemia  -     CBC (No Diff)    Essential hypertension  Well controlled on current medication, will refill medication today and as needed. She will RTO for repeat B/P check in 6 months.  -     Comprehensive Metabolic Panel    Acute URI of multiple sites  -     Discontinue: azithromycin (ZITHROMAX Z-MAN) 250 MG tablet; Take 2 tablets the first day, then 1 tablet daily for 4 days.  Cough    Smoker  We discussed working on smoking cessation and she will try nicotine patches.     Anxiety and depression  She was very tearful and honest about how she feels. She is denying  any thoughts of self harm and wants to get help.  -     Ambulatory Referral to Psychiatry  Patient Instructions   I will call you with lab results.  I have referred you to psychiatry to help with mood problems.    I have started you on a Zpak.  Get your flu shot at the pharmacy when you are feeling better.    Try Nicotine patches and gum to help you quit smoking.

## 2019-01-02 ENCOUNTER — OFFICE VISIT (OUTPATIENT)
Dept: FAMILY MEDICINE CLINIC | Facility: CLINIC | Age: 34
End: 2019-01-02

## 2019-01-02 VITALS
HEIGHT: 64 IN | HEART RATE: 84 BPM | SYSTOLIC BLOOD PRESSURE: 130 MMHG | RESPIRATION RATE: 16 BRPM | BODY MASS INDEX: 39.78 KG/M2 | WEIGHT: 233 LBS | OXYGEN SATURATION: 99 % | DIASTOLIC BLOOD PRESSURE: 80 MMHG

## 2019-01-02 DIAGNOSIS — I10 ESSENTIAL HYPERTENSION: Primary | ICD-10-CM

## 2019-01-02 DIAGNOSIS — F41.9 ANXIETY: ICD-10-CM

## 2019-01-02 PROCEDURE — 99213 OFFICE O/P EST LOW 20 MIN: CPT | Performed by: FAMILY MEDICINE

## 2019-01-02 RX ORDER — VENLAFAXINE HYDROCHLORIDE 150 MG/1
150 CAPSULE, EXTENDED RELEASE ORAL DAILY
COMMUNITY
Start: 2018-12-23 | End: 2019-07-09 | Stop reason: DRUGHIGH

## 2019-01-02 RX ORDER — BUPROPION HYDROCHLORIDE 300 MG/1
300 TABLET ORAL DAILY
COMMUNITY
Start: 2018-12-12 | End: 2019-07-09 | Stop reason: DRUGHIGH

## 2019-01-02 NOTE — PROGRESS NOTES
Subjective   Lizbeth Armando is a 33 y.o. female.     History of Present Illness   Lizbeth is here for bp check.  She is now seeing Sherice Forrest for anxiety/depression and states she is feeling great.  She feels she is on the right medication and is losing weight and taking better care of herself. She has lost 20 lbs since her last visit.  Lizbeth has a history of chronic hypertension and has been well controlled on current medications.She is tolerating medications without side effect. She reports no vision changes, headaches or lightheadedness. She is requesting refills of medications.    The following portions of the patient's history were reviewed and updated as appropriate: allergies, current medications, past medical history, past social history and problem list.    Review of Systems   All other systems reviewed and are negative.      Objective   Physical Exam   Constitutional: She is oriented to person, place, and time. She appears well-developed. No distress.   HENT:   Head: Normocephalic and atraumatic.   Eyes: EOM are normal. Pupils are equal, round, and reactive to light.   Cardiovascular: Normal rate, regular rhythm and normal heart sounds.   Pulmonary/Chest: Effort normal and breath sounds normal.   Neurological: She is alert and oriented to person, place, and time.   Skin: Skin is warm and dry.   Psychiatric: She has a normal mood and affect. Her behavior is normal. Judgment and thought content normal.   Nursing note and vitals reviewed.      Assessment/Plan   Lizbeth was seen today for hypertension.    Diagnoses and all orders for this visit:    Essential hypertension  Well controlled on current medication, will refill medication today and as needed. She will RTO for repeat B/P check in 6 months.    Anxiety  She is doing well on current meds and feeling much better.She will f/u with Sherice Forrest NP.

## 2019-01-28 DIAGNOSIS — I10 ESSENTIAL HYPERTENSION: ICD-10-CM

## 2019-01-29 RX ORDER — LISINOPRIL 40 MG/1
TABLET ORAL
Qty: 90 TABLET | Refills: 1 | Status: SHIPPED | OUTPATIENT
Start: 2019-01-29 | End: 2019-01-30 | Stop reason: SDUPTHER

## 2019-01-30 DIAGNOSIS — I10 ESSENTIAL HYPERTENSION: ICD-10-CM

## 2019-01-30 RX ORDER — LISINOPRIL 40 MG/1
40 TABLET ORAL DAILY
Qty: 15 TABLET | Refills: 0 | Status: SHIPPED | OUTPATIENT
Start: 2019-01-30 | End: 2019-11-28 | Stop reason: SDUPTHER

## 2019-07-09 ENCOUNTER — OFFICE VISIT (OUTPATIENT)
Dept: FAMILY MEDICINE CLINIC | Facility: CLINIC | Age: 34
End: 2019-07-09

## 2019-07-09 VITALS
HEART RATE: 102 BPM | OXYGEN SATURATION: 98 % | DIASTOLIC BLOOD PRESSURE: 80 MMHG | RESPIRATION RATE: 16 BRPM | HEIGHT: 64 IN | TEMPERATURE: 97.8 F | BODY MASS INDEX: 36.54 KG/M2 | SYSTOLIC BLOOD PRESSURE: 134 MMHG | WEIGHT: 214 LBS

## 2019-07-09 DIAGNOSIS — R53.83 FATIGUE, UNSPECIFIED TYPE: ICD-10-CM

## 2019-07-09 DIAGNOSIS — J06.9 ACUTE URI: ICD-10-CM

## 2019-07-09 DIAGNOSIS — D50.8 OTHER IRON DEFICIENCY ANEMIA: ICD-10-CM

## 2019-07-09 DIAGNOSIS — Z23 NEED FOR VACCINATION: ICD-10-CM

## 2019-07-09 DIAGNOSIS — I10 ESSENTIAL HYPERTENSION: ICD-10-CM

## 2019-07-09 DIAGNOSIS — Z00.00 ROUTINE GENERAL MEDICAL EXAMINATION AT A HEALTH CARE FACILITY: Primary | ICD-10-CM

## 2019-07-09 DIAGNOSIS — Z87.42 HISTORY OF PCOS: ICD-10-CM

## 2019-07-09 LAB
ERYTHROCYTE [DISTWIDTH] IN BLOOD BY AUTOMATED COUNT: 16 % (ref 12.3–15.4)
HCT VFR BLD AUTO: 38.3 % (ref 34–46.6)
HGB BLD-MCNC: 10.9 G/DL (ref 12–15.9)
MCH RBC QN AUTO: 25.4 PG (ref 26.6–33)
MCHC RBC AUTO-ENTMCNC: 28.5 G/DL (ref 31.5–35.7)
MCV RBC AUTO: 89.3 FL (ref 79–97)
PLATELET # BLD AUTO: 338 10*3/MM3 (ref 140–450)
RBC # BLD AUTO: 4.29 10*6/MM3 (ref 3.77–5.28)
TSH SERPL DL<=0.005 MIU/L-ACNC: 2.43 MIU/ML (ref 0.27–4.2)
WBC # BLD AUTO: 12.97 10*3/MM3 (ref 3.4–10.8)

## 2019-07-09 PROCEDURE — 99395 PREV VISIT EST AGE 18-39: CPT | Performed by: FAMILY MEDICINE

## 2019-07-09 PROCEDURE — 90471 IMMUNIZATION ADMIN: CPT | Performed by: FAMILY MEDICINE

## 2019-07-09 PROCEDURE — 99213 OFFICE O/P EST LOW 20 MIN: CPT | Performed by: FAMILY MEDICINE

## 2019-07-09 PROCEDURE — 90732 PPSV23 VACC 2 YRS+ SUBQ/IM: CPT | Performed by: FAMILY MEDICINE

## 2019-07-09 RX ORDER — BUPROPION HYDROCHLORIDE 150 MG/1
300 TABLET ORAL DAILY
COMMUNITY
Start: 2019-04-10 | End: 2022-10-12

## 2019-07-09 RX ORDER — AZITHROMYCIN 250 MG/1
TABLET, FILM COATED ORAL
Qty: 6 TABLET | Refills: 0 | Status: SHIPPED | OUTPATIENT
Start: 2019-07-09 | End: 2020-01-09

## 2019-07-09 RX ORDER — VENLAFAXINE HYDROCHLORIDE 75 MG/1
3 CAPSULE, EXTENDED RELEASE ORAL DAILY
COMMUNITY
Start: 2019-04-11 | End: 2020-03-12

## 2019-07-09 NOTE — PROGRESS NOTES
"Preventive Exam    History of Present Illness: Lizbeth is here for check up and review of routine health maintenance.       Lizbeth is c/o Rt ear pain, cough and sinus pain/pressure  and headache x 3-4 days. She has right ear pain.She is taking Excedrin.    She is a smoker but has stopped for the last 4 days.     She a hx of anxiety  and is on Wellbutrin and Effexor, followed by psychiatry and doing well.   She has a hx of blood loss anemia from and is concerned because of recent  Heavy menses and fatigue. She is very tired.    Lizbeth has a history of chronic hypertension and has been well controlled on current medications.She is tolerating medications without side effect. She reports no vision changes, headaches or lightheadedness. She is requesting refills of medications.    REVIEW OF SYSTEMS  Constitutional: Negative.    HENT: Negative.    Eyes: Negative.    Respiratory: Negative.    Cardiovascular: Negative.    Gastrointestinal: Negative.    Endocrine: Negative.    Genitourinary: Negative.    Musculoskeletal: Negative.  Skin: Negative.    Allergic/Immunologic: Negative.    Neurological: Negative.    Hematological: Negative.    Psychiatric/Behavioral: Negative.    All other systems reviewed and are negative.          PHYSICAL EXAM    Vitals:    07/09/19 1015   BP: 134/80   Pulse: 102   Resp: 16   Temp: 97.8 °F (36.6 °C)   SpO2: 98%   Weight: 97.1 kg (214 lb)   Height: 162.6 cm (64\")     GENERAL: alert and oriented, afebrile and vital signs stable  HEENT: oral mucosa moist, PEERLA, EOM, conjunctiva normal  No cervical adenopathy  LUNGS: clear to ascultation bilaterally, no rales, ronchi or wheezing  HEART: RRR S1 S2 without murmers, thrills, rubs or gallops  CHEST WALL: within normal limits, no tenderness  ABDOMEN: WNL. Normal BS.  EXTREMITIES: No clubbing, cyanosis or edema noted. Normal Pulses.  SKIN: warm, dry, no rashes noted  NEURO: CN II- XII grossly intact    ASSESSMENT AND PLAN  Problem List Items " Addressed This Visit        Cardiovascular and Mediastinum    Hypertension  Well controlled on current medication, will refill medication today and as needed. She will RTO for repeat B/P check in 6 months.       Hematopoietic and Hemostatic    Anemia  Will check CBC today. I am concerned because of her reports of heavy menses.         Other    History of PCOS  She is not on medication at this time but reports irregular menses.      Other Visit Diagnoses     Routine general medical examination at a health care facility    -  Primary    Relevant Orders      CBC (No Diff) (Completed)    Comprehensive Metabolic Panel    Lipid Panel With / Chol / HDL Ratio    Fatigue, unspecified type        Relevant Orders    CBC (No Diff) (Completed)    TSH (Completed)    Need for vaccination        Relevant Orders    Pneumococcal Polysaccharide Vaccine 23-Valent Greater Than or Equal To 3yo Subcutaneous / IM (Completed)    Acute URI      Because of he smoking hx, I have started her on a Zpak and advised OTC meds for symptoms.  She will call me if not improving in a week.     Relevant Medications    azithromycin (ZITHROMAX Z-MAN) 250 MG tablet        Routine health maintenance reviewed and discussed with Lizbeth.    .  Orders Placed This Encounter   Procedures   • Pneumococcal Polysaccharide Vaccine 23-Valent Greater Than or Equal To 3yo Subcutaneous / IM   • CBC (No Diff)     Order Specific Question:   [facility] Has the patient fasted?     Answer:   Yes   • Comprehensive Metabolic Panel   • Lipid Panel With / Chol / HDL Ratio   • TSH     Order Specific Question:   [facility] Has the patient fasted?     Answer:   Yes     Return in about 6 months (around 1/9/2020) for Recheck.

## 2019-07-09 NOTE — PATIENT INSTRUCTIONS
Annual Wellness  Personal Prevention Plan of Service   You are doing well. Please come back in 6 months for a B/P check.  I will call you with lab results.  I have started you on a Zpak. Please call if not improving in a week/  Date of Office Visit:  2019  Encounter Provider:  Bernardo Lau MD  Place of Service:  [facility]  Patient Name: Lizbeth Armando  :  1985    As part of the AnnualWellness portion of your visit today, we are providing you with this personalized preventive plan of services (PPPS). This plan is based upon recommendations of the [place] Preventive Services Task Force (USPSTF) and the Advisory Committee on Immunization Practices (ACIP).    This lists the preventive care services that should be considered, and provides dates of when you are due. Items listed as completed are up-to-date and do not require any further intervention.    Health Maintenance   Topic Date Due   • PNEUMOCOCCAL VACCINE (19-64 MEDIUM RISK) (1 of 1 - PPSV23) 02/10/2003   • INFLUENZA VACCINE  2019   • ANNUAL PHYSICAL  07/10/2020   • PAP SMEAR  2021   • TDAP/TD VACCINES (2 - Td) 2027       Orders Placed This Encounter   Procedures   • Pneumococcal Polysaccharide Vaccine 23-Valent Greater Than or Equal To 3yo Subcutaneous / IM   • CBC (No Diff)     Order Specific Question:   [facility] Has the patient fasted?     Answer:   Yes   • Comprehensive Metabolic Panel   • Lipid Panel With / Chol / HDL Ratio   • TSH     Order Specific Question:   [facility] Has the patient fasted?     Answer:   Yes       Return in about 6 months (around 2020) for Recheck.

## 2019-07-10 LAB
ALBUMIN SERPL-MCNC: 4 G/DL (ref 3.5–5.2)
ALBUMIN/GLOB SERPL: 1.4 G/DL
ALP SERPL-CCNC: 87 U/L (ref 39–117)
ALT SERPL-CCNC: 9 U/L (ref 1–33)
AST SERPL-CCNC: 15 U/L (ref 1–32)
BILIRUB SERPL-MCNC: <0.2 MG/DL (ref 0.2–1.2)
BUN SERPL-MCNC: 14 MG/DL (ref 6–20)
BUN/CREAT SERPL: 23.3 (ref 7–25)
CALCIUM SERPL-MCNC: 9.6 MG/DL (ref 8.6–10.5)
CHLORIDE SERPL-SCNC: 102 MMOL/L (ref 98–107)
CHOLEST SERPL-MCNC: 195 MG/DL (ref 0–200)
CHOLEST/HDLC SERPL: 5.42 {RATIO}
CO2 SERPL-SCNC: 21.3 MMOL/L (ref 22–29)
CREAT SERPL-MCNC: 0.6 MG/DL (ref 0.57–1)
GLOBULIN SER CALC-MCNC: 2.8 GM/DL
GLUCOSE SERPL-MCNC: 82 MG/DL (ref 65–99)
HDLC SERPL-MCNC: 36 MG/DL (ref 40–60)
LDLC SERPL CALC-MCNC: 110 MG/DL (ref 0–100)
POTASSIUM SERPL-SCNC: 4.6 MMOL/L (ref 3.5–5.2)
PROT SERPL-MCNC: 6.8 G/DL (ref 6–8.5)
SODIUM SERPL-SCNC: 139 MMOL/L (ref 136–145)
TRIGL SERPL-MCNC: 244 MG/DL (ref 0–150)
VLDLC SERPL CALC-MCNC: 48.8 MG/DL

## 2019-11-28 DIAGNOSIS — I10 ESSENTIAL HYPERTENSION: ICD-10-CM

## 2019-12-02 RX ORDER — LISINOPRIL 40 MG/1
TABLET ORAL
Qty: 90 TABLET | Refills: 4 | Status: SHIPPED | OUTPATIENT
Start: 2019-12-02 | End: 2020-03-12 | Stop reason: SDUPTHER

## 2020-01-09 ENCOUNTER — OFFICE VISIT (OUTPATIENT)
Dept: FAMILY MEDICINE CLINIC | Facility: CLINIC | Age: 35
End: 2020-01-09

## 2020-01-09 VITALS
DIASTOLIC BLOOD PRESSURE: 85 MMHG | HEIGHT: 64 IN | RESPIRATION RATE: 16 BRPM | HEART RATE: 91 BPM | OXYGEN SATURATION: 100 % | WEIGHT: 226 LBS | BODY MASS INDEX: 38.58 KG/M2 | SYSTOLIC BLOOD PRESSURE: 136 MMHG

## 2020-01-09 DIAGNOSIS — I10 ESSENTIAL HYPERTENSION: Primary | ICD-10-CM

## 2020-01-09 PROCEDURE — 99213 OFFICE O/P EST LOW 20 MIN: CPT | Performed by: FAMILY MEDICINE

## 2020-01-09 NOTE — PROGRESS NOTES
Subjective   Lizbeth Armando is a 34 y.o. female.     History of Present Illness   Lizbeth is here for bp check.  Her bp is elevated today and she believes it is because she has a stressful job.  She is taking her medications as Rx'd and reports no side effects.  She is c/o headache.      The following portions of the patient's history were reviewed and updated as appropriate: allergies, current medications, past family history, past medical history, past social history, past surgical history and problem list.    Review of Systems   Eyes: Negative for visual disturbance.   Neurological: Positive for headache. Negative for dizziness.   All other systems reviewed and are negative.      Objective   Physical Exam   Constitutional: She is oriented to person, place, and time. She appears well-developed.   HENT:   Head: Normocephalic and atraumatic.   Eyes: Pupils are equal, round, and reactive to light. EOM are normal.   Neck: Normal range of motion. Neck supple.   Cardiovascular: Normal rate, regular rhythm and normal heart sounds.   Pulmonary/Chest: Effort normal and breath sounds normal.   Musculoskeletal: Normal range of motion. She exhibits no edema.   Neurological: She is alert and oriented to person, place, and time.   Skin: Skin is warm and dry. No rash noted.   Nursing note and vitals reviewed.        Assessment/Plan   Lizbeth was seen today for hypertension.    Diagnoses and all orders for this visit:    Essential hypertension    Well controlled on current medication, will refill medication today and as needed. She will RTO for repeat B/P check in 2 months.

## 2020-03-12 ENCOUNTER — OFFICE VISIT (OUTPATIENT)
Dept: FAMILY MEDICINE CLINIC | Facility: CLINIC | Age: 35
End: 2020-03-12

## 2020-03-12 VITALS
HEIGHT: 64 IN | RESPIRATION RATE: 16 BRPM | SYSTOLIC BLOOD PRESSURE: 164 MMHG | HEART RATE: 88 BPM | OXYGEN SATURATION: 99 % | BODY MASS INDEX: 37.9 KG/M2 | WEIGHT: 222 LBS | DIASTOLIC BLOOD PRESSURE: 80 MMHG

## 2020-03-12 DIAGNOSIS — I10 ESSENTIAL HYPERTENSION: ICD-10-CM

## 2020-03-12 DIAGNOSIS — D50.8 OTHER IRON DEFICIENCY ANEMIA: Primary | ICD-10-CM

## 2020-03-12 PROCEDURE — 99214 OFFICE O/P EST MOD 30 MIN: CPT | Performed by: FAMILY MEDICINE

## 2020-03-12 RX ORDER — HYDROCHLOROTHIAZIDE 25 MG/1
25 TABLET ORAL DAILY
Qty: 30 TABLET | Refills: 6 | Status: SHIPPED | OUTPATIENT
Start: 2020-03-12 | End: 2022-10-12

## 2020-03-12 RX ORDER — LISINOPRIL 40 MG/1
40 TABLET ORAL DAILY
Qty: 90 TABLET | Refills: 4 | Status: SHIPPED | OUTPATIENT
Start: 2020-03-12 | End: 2020-05-08 | Stop reason: SDUPTHER

## 2020-03-12 RX ORDER — ESCITALOPRAM OXALATE 20 MG/1
1 TABLET ORAL DAILY
COMMUNITY
Start: 2020-01-23 | End: 2022-10-12

## 2020-03-12 NOTE — PROGRESS NOTES
Subjective   Lizbeth Armando is a 35 y.o. female.     History of Present Illness   Lizbeth is here for bp check.  She states she is taking her bp med.  She has noticed swelling in her hands and feet.  She has been working 90h weeks at work. She is standing for long periods of time. She has gained weight recently.  She has no c/o of SOA.  Her B/P is not well controlled.   She has chronic anemia and did not follow up with me after last labs showed a low HGB. She has a hx of heavy menses. She is not taking an iron supplement.    The following portions of the patient's history were reviewed and updated as appropriate: allergies, current medications, past family history, past medical history, past social history, past surgical history and problem list.    Review of Systems   Constitutional: Positive for fatigue.   HENT: Negative.    Eyes: Negative.  Negative for visual disturbance.   Respiratory: Negative.  Negative for shortness of breath.    Cardiovascular: Negative.  Negative for chest pain.        Edema   Genitourinary: Negative.    Skin: Negative.    Neurological: Positive for headache. Negative for dizziness.       Objective   Physical Exam   Constitutional: She is oriented to person, place, and time. She appears well-developed and well-nourished.   HENT:   Head: Normocephalic and atraumatic.   Eyes: Pupils are equal, round, and reactive to light. EOM are normal.   Neck: Normal range of motion.   Cardiovascular: Normal rate and regular rhythm.   Pulmonary/Chest: Effort normal.   Musculoskeletal: She exhibits no edema.   Mid dependent edema.   Neurological: She is alert and oriented to person, place, and time.   Skin: Skin is warm and dry. No rash noted.   Psychiatric: She has a normal mood and affect. Her behavior is normal.   Nursing note and vitals reviewed.        Assessment/Plan   Problem List Items Addressed This Visit        Cardiovascular and Mediastinum    Hypertension  Not well controlled.  I have added a  low dose diuretic to see if this will help.  She     Relevant Medications    lisinopril (PRINIVIL,ZESTRIL) 40 MG tablet    hydroCHLOROthiazide (HYDRODIURIL) 25 MG tablet    Other Relevant Orders    Comprehensive Metabolic Panel       Hematopoietic and Hemostatic    Anemia - Primary  Lost to follow up so will check today and I have encouraged her to come back in 2 weeks to develop a plan. She is fatigued but this may also be due to long work hours.      Relevant Orders    CBC (No Diff)               Return in about 2 weeks (around 3/26/2020).

## 2020-03-13 LAB
ALBUMIN SERPL-MCNC: 4.1 G/DL (ref 3.8–4.8)
ALBUMIN/GLOB SERPL: 1.6 {RATIO} (ref 1.2–2.2)
ALP SERPL-CCNC: 85 IU/L (ref 39–117)
ALT SERPL-CCNC: 14 IU/L (ref 0–32)
AST SERPL-CCNC: 15 IU/L (ref 0–40)
BILIRUB SERPL-MCNC: <0.2 MG/DL (ref 0–1.2)
BUN SERPL-MCNC: 14 MG/DL (ref 6–20)
BUN/CREAT SERPL: 27 (ref 9–23)
CALCIUM SERPL-MCNC: 9 MG/DL (ref 8.7–10.2)
CHLORIDE SERPL-SCNC: 103 MMOL/L (ref 96–106)
CO2 SERPL-SCNC: 23 MMOL/L (ref 20–29)
CREAT SERPL-MCNC: 0.51 MG/DL (ref 0.57–1)
ERYTHROCYTE [DISTWIDTH] IN BLOOD BY AUTOMATED COUNT: 16.6 % (ref 11.7–15.4)
GLOBULIN SER CALC-MCNC: 2.5 G/DL (ref 1.5–4.5)
GLUCOSE SERPL-MCNC: 92 MG/DL (ref 65–99)
HCT VFR BLD AUTO: 35.4 % (ref 34–46.6)
HGB BLD-MCNC: 10.5 G/DL (ref 11.1–15.9)
MCH RBC QN AUTO: 22.2 PG (ref 26.6–33)
MCHC RBC AUTO-ENTMCNC: 29.7 G/DL (ref 31.5–35.7)
MCV RBC AUTO: 75 FL (ref 79–97)
PLATELET # BLD AUTO: 373 X10E3/UL (ref 150–450)
POTASSIUM SERPL-SCNC: 4.7 MMOL/L (ref 3.5–5.2)
PROT SERPL-MCNC: 6.6 G/DL (ref 6–8.5)
RBC # BLD AUTO: 4.72 X10E6/UL (ref 3.77–5.28)
SODIUM SERPL-SCNC: 141 MMOL/L (ref 134–144)
WBC # BLD AUTO: 6.9 X10E3/UL (ref 3.4–10.8)

## 2020-03-16 DIAGNOSIS — D64.9 ANEMIA, UNSPECIFIED TYPE: Primary | ICD-10-CM

## 2020-05-08 ENCOUNTER — OFFICE VISIT (OUTPATIENT)
Dept: FAMILY MEDICINE CLINIC | Facility: CLINIC | Age: 35
End: 2020-05-08

## 2020-05-08 VITALS
HEIGHT: 64 IN | OXYGEN SATURATION: 98 % | WEIGHT: 222 LBS | SYSTOLIC BLOOD PRESSURE: 180 MMHG | HEART RATE: 98 BPM | RESPIRATION RATE: 16 BRPM | BODY MASS INDEX: 37.9 KG/M2 | DIASTOLIC BLOOD PRESSURE: 100 MMHG

## 2020-05-08 DIAGNOSIS — I10 ESSENTIAL HYPERTENSION: ICD-10-CM

## 2020-05-08 DIAGNOSIS — F41.9 ANXIETY: Primary | ICD-10-CM

## 2020-05-08 PROCEDURE — 99213 OFFICE O/P EST LOW 20 MIN: CPT | Performed by: FAMILY MEDICINE

## 2020-05-08 RX ORDER — LISINOPRIL 40 MG/1
40 TABLET ORAL DAILY
Qty: 90 TABLET | Refills: 4 | Status: SHIPPED | OUTPATIENT
Start: 2020-05-08 | End: 2021-05-24

## 2020-05-08 NOTE — PROGRESS NOTES
"Subjective   Lizbeth Armando is a 35 y.o. female.   Anxiety    History of Present Illness   Lizbeth is here to discuss anxiety.  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  She is quite weepy and states she feels like \"I am going to smother\" when I wear a mask.  She is currently looking for a new job.  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  She already has a significant amount of anxiety before this pandemic occurred but now it is gotten worse and she is particularly claustrophobic and feels like she is just more lucid if she has to wear a surgical mask during the day.  She is talk to her employer about this and she is been told that if she cannot wear the mask she will have to take an unpaid leave which of course she can afford to do.  She also has a history of chronic hypertension and is here today to get her blood pressure evaluated.  She is tolerating her medications well and is asking for refill of her medications.  She reports no adverse side effects.  The following portions of the patient's history were reviewed and updated as appropriate: allergies, current medications, past family history, past medical history, past social history, past surgical history and problem list.    Review of Systems   Constitutional: Negative for fever.   Eyes: Negative for visual disturbance.   Respiratory: Negative for cough and shortness of breath.    Neurological: Negative for dizziness and headache.   Psychiatric/Behavioral: The patient is nervous/anxious.    All other systems reviewed and are negative.      Objective   Physical Exam   Constitutional: She is oriented to person, place, and time. She appears well-developed.   HENT:   Head: Normocephalic and atraumatic.   Eyes: Pupils are equal, round, and reactive to light. EOM are normal. "   Neck: Normal range of motion. Neck supple.   Cardiovascular: Normal rate and regular rhythm.   Pulmonary/Chest: Effort normal and breath sounds normal.   Musculoskeletal: Normal range of motion. She exhibits no edema.   Neurological: She is alert and oriented to person, place, and time.   Skin: Skin is warm and dry. No rash noted.   Psychiatric: She has a normal mood and affect. Her behavior is normal. Judgment and thought content normal.   Nursing note and vitals reviewed.        Assessment/Plan   Problem List Items Addressed This Visit        Cardiovascular and Mediastinum    Hypertension  Well controlled on current medication, will refill medication today and as needed. She will RTO for repeat B/P check in 6 months.    Relevant Medications    lisinopril (PRINIVIL,ZESTRIL) 40 MG tablet       Other    Anxiety - Primary  I spent a long time with Lizbeth discussing various options to wearing a mask.  We talked about trying gaitor, or bandanna, or a cloth mask.  We also discussed using a fascial doctor about how she can make one for herself.  She is going try these options and see if she can tolerate it if she cannot she is going asked me to write her a note to her employer explaining that she cannot wear a mask due to anxiety claustrophobia and asking her to be reassigned to a location where she is on contact with the public.  I have also asked her to contact her psychiatrist who is very good at helping people work through these issues of anxiety.               Return if symptoms worsen or fail to improve.

## 2020-11-10 ENCOUNTER — TELEMEDICINE (OUTPATIENT)
Dept: FAMILY MEDICINE CLINIC | Facility: CLINIC | Age: 35
End: 2020-11-10

## 2020-11-10 VITALS — BODY MASS INDEX: 40.97 KG/M2 | WEIGHT: 240 LBS | HEIGHT: 64 IN

## 2020-11-10 DIAGNOSIS — R06.02 SHORTNESS OF BREATH: ICD-10-CM

## 2020-11-10 DIAGNOSIS — R05.9 COUGH: Primary | ICD-10-CM

## 2020-11-10 PROCEDURE — 99213 OFFICE O/P EST LOW 20 MIN: CPT | Performed by: FAMILY MEDICINE

## 2020-11-10 NOTE — PROGRESS NOTES
Subjective   Lizbeth Armando is a 35 y.o. female.   Cough  You have chosen to receive care through a telehealth visit.  Do you consent to use a video/audio connection for your medical care today? Yes  History of Present Illness   Lizbeth is on this video call today with complaints of cough,congestion and shortness of breath that started 2 weeks ago.  She is not certain if she has had  a fever but does not have a way to take it.  She denies a loss of sense of taste or smell.  She actually feels like she might be a little bit better today, but she is clearly working hard to breathe during our evaluation.  Lizbeth works for [employer] and works long, long hours and always wears a mask.  She states that some of the places that she has been working in, though, do not enforce mask wearing and she is no idea who she has been exposed to but she suspects she has been exposed multiple times.  Lizbeth also has difficult to control hypertension which puts her at higher risk for complications.  She is also overweight and has been working hard to lose weight but that is still an issue for concerned about COVID-19.    The following portions of the patient's history were reviewed and updated as appropriate: allergies, current medications, past family history, past medical history, past social history, past surgical history and problem list.    Review of Systems   Constitutional: Positive for fatigue.   HENT: Positive for congestion and sinus pressure.    Respiratory: Positive for cough and shortness of breath.    Genitourinary: Negative.    Musculoskeletal: Negative.    Psychiatric/Behavioral: Negative.        Objective   Physical Exam  Vitals signs and nursing note reviewed.   HENT:      Nose: Congestion present.   Pulmonary:      Comments: Working hard to breathe  Neurological:      General: No focal deficit present.      Mental Status: She is alert and oriented to person, place, and time.   Psychiatric:         Mood and Affect:  Mood normal.         Behavior: Behavior normal.         Thought Content: Thought content normal.           Assessment/Plan   Problem List Items Addressed This Visit     None      Visit Diagnoses     Cough    -  Primary    Relevant Orders    COVID-19,[facility] ROUTINE, NP/OP SWAB IN TRANSPORT MEDIA OR ESWAB 72 HR TAT - Swab, Nasopharynx    Shortness of breath        Relevant Orders    COVID-19,[facility] ROUTINE, NP/OP SWAB IN TRANSPORT MEDIA OR ESWAB 72 HR TAT - Swab, Nasopharynx      I have referred  Lizbeth to [facility] for testing. Her symptoms are concerning for COVID and she works with the public.          No follow-ups on file.

## 2020-11-12 DIAGNOSIS — R05.9 COUGH: Primary | ICD-10-CM

## 2020-11-12 DIAGNOSIS — J06.9 ACUTE URI: ICD-10-CM

## 2020-11-12 RX ORDER — AMOXICILLIN 500 MG/1
TABLET, FILM COATED ORAL
Qty: 60 TABLET | Refills: 0 | Status: SHIPPED | OUTPATIENT
Start: 2020-11-12 | End: 2022-10-12

## 2021-04-16 ENCOUNTER — BULK ORDERING (OUTPATIENT)
Dept: CASE MANAGEMENT | Facility: OTHER | Age: 36
End: 2021-04-16

## 2021-04-16 DIAGNOSIS — Z23 IMMUNIZATION DUE: ICD-10-CM

## 2021-05-22 DIAGNOSIS — I10 ESSENTIAL HYPERTENSION: ICD-10-CM

## 2021-05-24 RX ORDER — LISINOPRIL 40 MG/1
TABLET ORAL
Qty: 90 TABLET | Refills: 3 | Status: SHIPPED | OUTPATIENT
Start: 2021-05-24 | End: 2022-11-29 | Stop reason: SDUPTHER

## 2021-08-10 ENCOUNTER — OFFICE VISIT (OUTPATIENT)
Dept: FAMILY MEDICINE CLINIC | Facility: CLINIC | Age: 36
End: 2021-08-10

## 2021-08-10 VITALS
HEIGHT: 63 IN | OXYGEN SATURATION: 99 % | BODY MASS INDEX: 44.93 KG/M2 | DIASTOLIC BLOOD PRESSURE: 82 MMHG | HEART RATE: 92 BPM | RESPIRATION RATE: 16 BRPM | SYSTOLIC BLOOD PRESSURE: 130 MMHG | WEIGHT: 253.6 LBS

## 2021-08-10 DIAGNOSIS — R11.2 NAUSEA AND VOMITING IN ADULT: ICD-10-CM

## 2021-08-10 DIAGNOSIS — R10.11 RIGHT UPPER QUADRANT ABDOMINAL PAIN: Primary | ICD-10-CM

## 2021-08-10 PROBLEM — I10 HYPERTENSION: Status: ACTIVE | Noted: 2021-08-10

## 2021-08-10 PROCEDURE — 99213 OFFICE O/P EST LOW 20 MIN: CPT | Performed by: FAMILY MEDICINE

## 2021-08-10 PROCEDURE — 76700 US EXAM ABDOM COMPLETE: CPT | Performed by: FAMILY MEDICINE

## 2021-08-10 RX ORDER — ONDANSETRON 4 MG/1
TABLET, FILM COATED ORAL
COMMUNITY
Start: 2021-08-01 | End: 2022-10-12

## 2021-08-10 RX ORDER — ONDANSETRON 4 MG/1
4 TABLET, FILM COATED ORAL
COMMUNITY
Start: 2021-07-31 | End: 2022-10-12

## 2021-08-10 RX ORDER — DICYCLOMINE HCL 20 MG
TABLET ORAL
COMMUNITY
Start: 2021-08-01 | End: 2022-10-12

## 2021-08-10 NOTE — PROGRESS NOTES
Subjective   Lizbeth Armando is a 36 y.o. female.   Pain (gallbladder)    History of Present Illness   Lizbeth is here today to follow-up on abdominal pain.  On 7/31/ 21 she went to the ER , with complaints of vomiting and abd pain.  CT scan was done per her report and nothing concerning was found.  Based on her description of how she felt it sounds very much like she had a gallbladder attack.  She is willing to get this evaluated.  She has very few symptoms today but she is being very careful about what she eats.  The following portions of the patient's history were reviewed and updated as appropriate: allergies, current medications, past family history, past medical history, past social history, past surgical history and problem list.    Review of Systems   Gastrointestinal: Positive for abdominal pain, nausea and vomiting.   All other systems reviewed and are negative.      Objective   Physical Exam  Vitals and nursing note reviewed.   Constitutional:       Appearance: She is well-developed.   HENT:      Head: Normocephalic and atraumatic.   Eyes:      Pupils: Pupils are equal, round, and reactive to light.   Cardiovascular:      Rate and Rhythm: Normal rate and regular rhythm.      Heart sounds: Normal heart sounds.   Pulmonary:      Effort: Pulmonary effort is normal.      Breath sounds: Normal breath sounds.   Abdominal:      Palpations: Abdomen is soft.      Tenderness: There is no abdominal tenderness.   Skin:     General: Skin is warm and dry.   Neurological:      Mental Status: She is alert and oriented to person, place, and time.           Assessment/Plan   Problem List Items Addressed This Visit     None      Visit Diagnoses     Right upper quadrant abdominal pain    -  Primary    Relevant Orders    US Abdomen Complete    Nausea and vomiting in adult        Relevant Orders    US Abdomen Complete        I referred her for an abdominal ultrasound and advised her to eat a low-fat diet for now.  I have  encouraged her to go to the ER if her symptoms get worse.  I have encouraged her to get a Covid vaccine as soon as she can.       Return if symptoms worsen or fail to improve.

## 2021-08-20 DIAGNOSIS — R10.9 ABDOMINAL PAIN, UNSPECIFIED ABDOMINAL LOCATION: Primary | ICD-10-CM

## 2021-08-21 ENCOUNTER — HOSPITAL ENCOUNTER (OUTPATIENT)
Dept: CT IMAGING | Facility: HOSPITAL | Age: 36
Discharge: HOME | End: 2021-08-21
Admitting: FAMILY MEDICINE

## 2021-08-21 DIAGNOSIS — R10.9 ABDOMINAL PAIN, UNSPECIFIED ABDOMINAL LOCATION: ICD-10-CM

## 2021-08-21 LAB — CREAT BLDA-MCNC: 0.6 MG/DL (ref 0.6–1.3)

## 2021-08-21 PROCEDURE — 25010000002 IOPAMIDOL 61 % SOLUTION: Performed by: FAMILY MEDICINE

## 2021-08-21 PROCEDURE — 82565 ASSAY OF CREATININE: CPT

## 2021-08-21 PROCEDURE — 74177 CT ABD & PELVIS W/CONTRAST: CPT

## 2021-08-21 PROCEDURE — 0 DIATRIZOATE MEGLUMINE & SODIUM PER 1 ML: Performed by: FAMILY MEDICINE

## 2021-08-21 RX ADMIN — IOPAMIDOL 100 ML: 612 INJECTION, SOLUTION INTRAVENOUS at 14:59

## 2021-08-21 RX ADMIN — DIATRIZOATE MEGLUMINE AND DIATRIZOATE SODIUM 30 ML: 660; 100 LIQUID ORAL; RECTAL at 14:50

## 2021-08-24 DIAGNOSIS — R10.11 RIGHT UPPER QUADRANT ABDOMINAL PAIN: Primary | ICD-10-CM

## 2021-08-25 DIAGNOSIS — R10.11 RUQ ABDOMINAL PAIN: Primary | ICD-10-CM

## 2022-10-10 ENCOUNTER — TELEPHONE (OUTPATIENT)
Dept: FAMILY MEDICINE CLINIC | Facility: CLINIC | Age: 37
End: 2022-10-10

## 2022-10-10 NOTE — TELEPHONE ENCOUNTER
Caller: Lizbeth Armando    Relationship: Self    Best call back number: [phone]    What is the medical concern/diagnosis: SWELLING ON HANDS AND FEET, WITH PAIN     What specialty or service is being requested: RUMENOTOMY     Any additional details: PATIENT HAD A REFERRAL A COUPLE OF YEARS AGO, BUT WOULD NEED TO BE REFERRED AGAIN

## 2022-10-12 ENCOUNTER — OFFICE VISIT (OUTPATIENT)
Dept: FAMILY MEDICINE CLINIC | Facility: CLINIC | Age: 37
End: 2022-10-12

## 2022-10-12 VITALS
RESPIRATION RATE: 18 BRPM | WEIGHT: 233 LBS | DIASTOLIC BLOOD PRESSURE: 90 MMHG | HEIGHT: 63 IN | SYSTOLIC BLOOD PRESSURE: 158 MMHG | OXYGEN SATURATION: 98 % | HEART RATE: 95 BPM | BODY MASS INDEX: 41.29 KG/M2

## 2022-10-12 DIAGNOSIS — G89.29 CHRONIC JOINT PAIN: Primary | ICD-10-CM

## 2022-10-12 DIAGNOSIS — I10 UNCONTROLLED HYPERTENSION: ICD-10-CM

## 2022-10-12 DIAGNOSIS — Z23 NEED FOR VACCINATION: ICD-10-CM

## 2022-10-12 DIAGNOSIS — M25.50 CHRONIC JOINT PAIN: Primary | ICD-10-CM

## 2022-10-12 PROCEDURE — 99214 OFFICE O/P EST MOD 30 MIN: CPT | Performed by: FAMILY MEDICINE

## 2022-10-12 PROCEDURE — 91305 COVID-19 (PFIZER) 12+ YRS: CPT | Performed by: FAMILY MEDICINE

## 2022-10-12 PROCEDURE — 0051A COVID-19 (PFIZER) 12+ YRS: CPT | Performed by: FAMILY MEDICINE

## 2022-10-12 RX ORDER — BUPROPION HYDROCHLORIDE 300 MG/1
TABLET ORAL
COMMUNITY
Start: 2022-09-13

## 2022-10-12 RX ORDER — COVID-19 ANTIGEN TEST
KIT MISCELLANEOUS
COMMUNITY
Start: 2022-09-13 | End: 2022-11-09

## 2022-10-12 RX ORDER — DULOXETIN HYDROCHLORIDE 30 MG/1
CAPSULE, DELAYED RELEASE ORAL
COMMUNITY
Start: 2022-10-10

## 2022-10-12 RX ORDER — DULOXETIN HYDROCHLORIDE 60 MG/1
CAPSULE, DELAYED RELEASE ORAL
COMMUNITY
Start: 2022-10-10

## 2022-10-12 RX ORDER — METHYLPHENIDATE HYDROCHLORIDE 10 MG/1
TABLET ORAL
COMMUNITY
Start: 2022-09-14

## 2022-10-12 NOTE — PROGRESS NOTES
Subjective   Lizbeth Armando is a 37 y.o. female.   Edema (Hands, feet, knees) and Back Pain    History of Present Illness   Lizbeth is here today after a long abscess.  She was struggling with depression and is working with an excellent psychiatric nurse practitioner and now is doing well.  She is trying to get her life back in order.  She is here today because she has really difficult joint pains.  She had been seeing a rheumatologist but in between jobs and her recent bout with significant depression she missed an appointment now needs a referral.  She has chronic hypertension and states that her blood pressure had been well controlled but she is a little nervous today and is asking to repeat this blood pressure again at her next visit.  She has been taking lisinopril 40 mg a day as advised.  She is also taking Ritalin which may be part of the issue.  This is a new medication for her.    The following portions of the patient's history were reviewed and updated as appropriate: allergies, current medications, past family history, past medical history, past social history, past surgical history and problem list.    Review of Systems   Constitutional: Negative.    HENT: Negative.    Eyes: Negative.    Respiratory: Negative.    Cardiovascular: Negative.    Genitourinary: Negative.    Musculoskeletal: Positive for arthralgias, joint swelling and myalgias.   Skin: Negative.    Neurological: Negative.        Objective   Physical Exam  Vitals and nursing note reviewed.   Constitutional:       Appearance: She is well-developed.   HENT:      Head: Normocephalic and atraumatic.   Eyes:      Pupils: Pupils are equal, round, and reactive to light.   Cardiovascular:      Rate and Rhythm: Normal rate and regular rhythm.      Heart sounds: Normal heart sounds.   Pulmonary:      Effort: Pulmonary effort is normal.      Breath sounds: Normal breath sounds.   Musculoskeletal:         General: Swelling present. Normal range of  motion.      Cervical back: Normal range of motion and neck supple.   Skin:     General: Skin is warm and dry.      Findings: No rash.   Neurological:      Mental Status: She is alert and oriented to person, place, and time.   Psychiatric:         Mood and Affect: Mood normal.         Behavior: Behavior normal.         Thought Content: Thought content normal.         Judgment: Judgment normal.           Assessment & Plan   Problem List Items Addressed This Visit        Cardiac and Vasculature    Uncontrolled hypertension  Her blood pressure is elevated today but she is willing to come back in a few weeks for us to check that again to make sure we get some blood work and take care of her.  She is taking lisinopril 40 mg a day but I encouraged her to take it on a regular basis.     Other Visit Diagnoses     Chronic joint pain    -  Primary  She is pretty certain that the pain she is feeling may be contributing to her elevated blood pressure and I think that is a real possibility.  I referred her back to her rheumatologist for evaluation and treatment.    Relevant Orders    Ambulatory Referral to Rheumatology (Completed)    Need for vaccination        Relevant Orders    COVID-19 Vaccine (Pfizer) Gray Cap Primary Series (Completed)               Return in about 4 weeks (around 11/9/2022) for Recheck.   Answers for HPI/ROS submitted by the patient on 10/11/2022  Please describe your symptoms.: Swollen, hands, knees, ankles, feet.  Ache in joints and muscles.  Loss of muscle use.  Have you had these symptoms before?: Yes  How long have you been having these symptoms?: Greater than 2 weeks  Please list any medications you are currently taking for this condition.: None  Please describe any probable cause for these symptoms. : Arthritis and diabetes and heart problems run on both sides of family.  What is the primary reason for your visit?: Other

## 2022-10-13 PROBLEM — I10 UNCONTROLLED HYPERTENSION: Status: ACTIVE | Noted: 2022-10-13

## 2022-11-09 ENCOUNTER — OFFICE VISIT (OUTPATIENT)
Dept: FAMILY MEDICINE CLINIC | Facility: CLINIC | Age: 37
End: 2022-11-09

## 2022-11-09 VITALS
SYSTOLIC BLOOD PRESSURE: 144 MMHG | DIASTOLIC BLOOD PRESSURE: 90 MMHG | OXYGEN SATURATION: 98 % | RESPIRATION RATE: 16 BRPM | WEIGHT: 225 LBS | HEART RATE: 94 BPM | BODY MASS INDEX: 39.87 KG/M2 | HEIGHT: 63 IN

## 2022-11-09 DIAGNOSIS — I10 ESSENTIAL HYPERTENSION: Primary | ICD-10-CM

## 2022-11-09 DIAGNOSIS — Z23 NEED FOR VACCINATION: ICD-10-CM

## 2022-11-09 PROCEDURE — 99213 OFFICE O/P EST LOW 20 MIN: CPT | Performed by: FAMILY MEDICINE

## 2022-11-09 PROCEDURE — 90686 IIV4 VACC NO PRSV 0.5 ML IM: CPT | Performed by: FAMILY MEDICINE

## 2022-11-09 PROCEDURE — 90471 IMMUNIZATION ADMIN: CPT | Performed by: FAMILY MEDICINE

## 2022-11-09 RX ORDER — INDAPAMIDE 2.5 MG/1
2.5 TABLET, FILM COATED ORAL EVERY MORNING
Qty: 30 TABLET | Refills: 1 | Status: SHIPPED | OUTPATIENT
Start: 2022-11-09 | End: 2022-12-09

## 2022-11-09 NOTE — PROGRESS NOTES
"Subjective   Lizbeth Armando is a 37 y.o. female.   Hypertension    History of Present Illness   Lizbeth is here for bp check.  She is taking her meds and reports no side effects.  She is now taking lisinopril 40 mg a day.  She is doing better but she is not yet quite at goal.  She is willing to add another medication.  She is still working with rheumatology to sort out what the cause as and how to treat her arthralgias and myalgias.    Review of Systems   Constitutional: Negative.    HENT: Negative.    Eyes: Negative.    Respiratory: Negative.    Cardiovascular: Negative.    Genitourinary: Negative.    Musculoskeletal: Positive for arthralgias, joint swelling and myalgias.   Skin: Negative.    Neurological: Negative.        Objective   Vitals:    11/09/22 1416   BP: 144/90   Pulse: 94   Resp: 16   SpO2: 98%   Weight: 102 kg (225 lb)   Height: 160 cm (63\")      Body mass index is 39.86 kg/m².    Physical Exam  Vitals and nursing note reviewed.   Constitutional:       Appearance: She is well-developed.   HENT:      Head: Normocephalic and atraumatic.   Eyes:      Pupils: Pupils are equal, round, and reactive to light.   Cardiovascular:      Rate and Rhythm: Normal rate and regular rhythm.      Heart sounds: Normal heart sounds.   Pulmonary:      Effort: Pulmonary effort is normal.      Breath sounds: Normal breath sounds.   Musculoskeletal:         General: Normal range of motion.      Cervical back: Normal range of motion and neck supple.   Skin:     General: Skin is warm and dry.      Findings: No rash.   Neurological:      Mental Status: She is alert and oriented to person, place, and time.           Assessment & Plan   Problem List Items Addressed This Visit    None  Visit Diagnoses     Essential hypertension    -  Primary  She is doing better but has not yet reached goal.  So I have added Lozol 2.5 mg a day to lisinopril 40 mg a day.  She will return to the office for follow-up evaluation in 1 week    Relevant " Medications    indapamide (LOZOL) 2.5 MG tablet    Need for vaccination        Relevant Orders    COVID-19 Vaccine (Pfizer) Gray Cap Primary Series    FluLaval/Fluzone >6 mos ([id number])               Return in about 1 week (around 11/16/2022) for Recheck.   Answers for HPI/ROS submitted by the patient on 11/8/2022  Please describe your symptoms.: Follow up from last visit and to receive the 2nd covid shot.  Have you had these symptoms before?: Yes  How long have you been having these symptoms?: Greater than 2 weeks  What is the primary reason for your visit?: Other

## 2022-11-29 ENCOUNTER — OFFICE VISIT (OUTPATIENT)
Dept: FAMILY MEDICINE CLINIC | Facility: CLINIC | Age: 37
End: 2022-11-29

## 2022-11-29 VITALS
BODY MASS INDEX: 42.7 KG/M2 | OXYGEN SATURATION: 99 % | DIASTOLIC BLOOD PRESSURE: 94 MMHG | HEART RATE: 83 BPM | HEIGHT: 63 IN | WEIGHT: 241 LBS | RESPIRATION RATE: 16 BRPM | SYSTOLIC BLOOD PRESSURE: 160 MMHG

## 2022-11-29 DIAGNOSIS — I10 ESSENTIAL HYPERTENSION: ICD-10-CM

## 2022-11-29 PROCEDURE — 99213 OFFICE O/P EST LOW 20 MIN: CPT | Performed by: FAMILY MEDICINE

## 2022-11-29 RX ORDER — LISINOPRIL 40 MG/1
40 TABLET ORAL DAILY
Qty: 30 TABLET | Refills: 3 | Status: SHIPPED | OUTPATIENT
Start: 2022-11-29

## 2022-11-29 NOTE — PROGRESS NOTES
"Subjective   Lizbeth Amrando is a 37 y.o. female.   Hypertension    History of Present Illness   Lizbeth is here for bp check.  She has been out of medication x 4 days.  She states she is not feeling well today, headache, ringing in her ears and fatigue she believes is due to bp.  She would like to get a refill of her medication and go pick it up and take it.  She states she had been doing well on this medication and she just got busy and was not able to  her refill.  Review of Systems   Constitutional: Positive for fatigue.   HENT: Negative.    Eyes: Negative.    Respiratory: Negative.    Cardiovascular: Negative.    Genitourinary: Negative.    Skin: Negative.    Neurological: Positive for headache.       Objective   Vitals:    11/29/22 1154   BP: 160/94   Pulse: 83   Resp: 16   SpO2: 99%   Weight: 109 kg (241 lb)   Height: 160 cm (63\")      Body mass index is 42.69 kg/m².    Physical Exam  Vitals and nursing note reviewed.   Constitutional:       Appearance: She is well-developed.   HENT:      Head: Normocephalic and atraumatic.   Eyes:      Pupils: Pupils are equal, round, and reactive to light.   Cardiovascular:      Rate and Rhythm: Normal rate and regular rhythm.      Pulses: Normal pulses.      Heart sounds: Normal heart sounds.   Pulmonary:      Effort: Pulmonary effort is normal.      Breath sounds: Normal breath sounds.   Musculoskeletal:         General: Normal range of motion.      Cervical back: Normal range of motion and neck supple.   Skin:     General: Skin is warm and dry.      Findings: No rash.   Neurological:      Mental Status: She is alert and oriented to person, place, and time.         {Review (Popup) :23}  Assessment & Plan   Problem List Items Addressed This Visit    None  Visit Diagnoses     Essential hypertension    I have refilled her lisinopril and she has indapamide at home and she is going to take her medication and follow-up with me in 4 weeks.    Relevant Medications     " lisinopril (PRINIVIL,ZESTRIL) 40 MG tablet             No orders of the defined types were placed in this encounter.       Return in about 4 weeks (around 12/27/2022) for Annual physical.   Answers for HPI/ROS submitted by the patient on 11/29/2022  Please describe your symptoms.: Visiting for the 2nd covid shot  Have you had these symptoms before?: No  How long have you been having these symptoms?: 1-4 days  What is the primary reason for your visit?: Other

## 2024-01-23 ENCOUNTER — TELEMEDICINE (OUTPATIENT)
Dept: FAMILY MEDICINE CLINIC | Facility: CLINIC | Age: 39
End: 2024-01-23
Payer: COMMERCIAL

## 2024-01-23 DIAGNOSIS — U07.1 COVID-19: Primary | ICD-10-CM

## 2024-01-23 DIAGNOSIS — I10 ESSENTIAL HYPERTENSION: ICD-10-CM

## 2024-01-23 DIAGNOSIS — I10 PRIMARY HYPERTENSION: ICD-10-CM

## 2024-01-23 PROCEDURE — 99213 OFFICE O/P EST LOW 20 MIN: CPT | Performed by: NURSE PRACTITIONER

## 2024-01-23 RX ORDER — LISINOPRIL 40 MG/1
40 TABLET ORAL DAILY
Qty: 30 TABLET | Refills: 3 | Status: SHIPPED | OUTPATIENT
Start: 2024-01-23

## 2024-01-23 NOTE — PROGRESS NOTES
Maria Antonia Armando is a 38 y.o. female.   This was an audio and video enabled telemedicine encounter.   History of Present Illness     The following portions of the patient's history were reviewed and updated as appropriate: allergies, current medications, past family history, past medical history, past social history, past surgical history and problem list.    Review of Systems    Objective   Physical Exam    There were no vitals filed for this visit.  There is no height or weight on file to calculate BMI.    Procedures    Assessment & Plan   Problems Addressed this Visit       Hypertension     Other Visit Diagnoses       COVID-19    -  Primary    Essential hypertension              Diagnoses         Codes Comments    COVID-19    -  Primary ICD-10-CM: U07.1  ICD-9-CM: 079.89     Primary hypertension     ICD-10-CM: I10  ICD-9-CM: 401.9     Essential hypertension     ICD-10-CM: I10  ICD-9-CM: 401.9                    You have chosen to receive care through a telehealth visit. Do you consent to use a telephone visit for your medical care today? Yes    Education provided in AVS   Return if symptoms worsen or fail to improve.

## 2024-01-23 NOTE — PROGRESS NOTES
Subjective   Lizbeth Armando is a 38 y.o. female.   This was an audio and video enabled telemedicine encounter.   History of Present Illness    Video visit  Dr Lau pt, new to this provider    Covid positive on Sunday on home test. She is taking nyquil to help sleep. Symptoms were cough, dizziness, lightheaded, severe fatigue last week w nausea. Symptoms have improved.  No fever, improved cough, no GI symptoms. Keeping food down now, sleep has been better/less fatigued. She needs documentation to go back to work.     Chronic htn x years. She is out of lisinopril, needs refills. Denies chest pain, palps, headache or vision changes.     The following portions of the patient's history were reviewed and updated as appropriate: allergies, current medications, past family history, past medical history, past social history, past surgical history and problem list.    Review of Systems   Constitutional:  Positive for activity change and fatigue. Negative for fever.   HENT:  Negative for congestion.    Respiratory:  Positive for cough (improved). Negative for shortness of breath and wheezing.    Cardiovascular:  Negative for chest pain, palpitations and leg swelling.   Gastrointestinal:  Positive for nausea.   Neurological:  Negative for headache.   Psychiatric/Behavioral:  Positive for stress. Negative for sleep disturbance.        Objective   Physical Exam  Constitutional:       Appearance: Normal appearance.   HENT:      Mouth/Throat:      Mouth: Mucous membranes are moist.   Pulmonary:      Effort: Pulmonary effort is normal.   Skin:     General: Skin is dry.   Neurological:      General: No focal deficit present.      Mental Status: She is alert.         There were no vitals filed for this visit.  There is no height or weight on file to calculate BMI.    Procedures    Assessment & Plan   Problems Addressed this Visit       Hypertension    Relevant Medications    lisinopril (PRINIVIL,ZESTRIL) 40 MG tablet     Other Visit  Diagnoses       COVID-19    -  Primary    Essential hypertension        Relevant Medications    lisinopril (PRINIVIL,ZESTRIL) 40 MG tablet          Diagnoses         Codes Comments    COVID-19    -  Primary ICD-10-CM: U07.1  ICD-9-CM: 079.89     Primary hypertension     ICD-10-CM: I10  ICD-9-CM: 401.9     Essential hypertension     ICD-10-CM: I10  ICD-9-CM: 401.9           COVID 19- REVIEWED ISOLATION X 5 DAYS, MASK X 5 DAYS, WORK NOTE SENT, REST, HYDRATE     HTN- RF LISINOPRIL , LOW NA DIET, WALK,  DRINK WATER, LIMIT CAFFEINE, STRESS       You have chosen to receive care through a telehealth visit. Do you consent to use a telephone visit for your medical care today? Yes    Education provided in AVS   Return if symptoms worsen or fail to improve.

## 2024-01-23 NOTE — LETTER
January 23, 2024     Patient: Lizbeth Armando   YOB: 1985   Date of Visit: 1/23/2024       To Whom It May Concern:    It is my medical opinion that Lizbeth Armando may return to work/school in 5 days, Return Fri 1/26/24 .            Sincerely,        MONICA Robledo    CC: No Recipients